# Patient Record
Sex: MALE | Race: WHITE | NOT HISPANIC OR LATINO
[De-identification: names, ages, dates, MRNs, and addresses within clinical notes are randomized per-mention and may not be internally consistent; named-entity substitution may affect disease eponyms.]

---

## 2018-08-07 PROBLEM — Z00.00 ENCOUNTER FOR PREVENTIVE HEALTH EXAMINATION: Status: ACTIVE | Noted: 2018-08-07

## 2018-09-05 ENCOUNTER — APPOINTMENT (OUTPATIENT)
Dept: GASTROENTEROLOGY | Facility: CLINIC | Age: 33
End: 2018-09-05

## 2018-09-17 ENCOUNTER — APPOINTMENT (OUTPATIENT)
Dept: HEART AND VASCULAR | Facility: CLINIC | Age: 33
End: 2018-09-17

## 2018-10-22 ENCOUNTER — APPOINTMENT (OUTPATIENT)
Dept: GASTROENTEROLOGY | Facility: CLINIC | Age: 33
End: 2018-10-22
Payer: MEDICAID

## 2018-10-22 VITALS
DIASTOLIC BLOOD PRESSURE: 85 MMHG | BODY MASS INDEX: 43.95 KG/M2 | HEIGHT: 68 IN | WEIGHT: 290 LBS | RESPIRATION RATE: 16 BRPM | SYSTOLIC BLOOD PRESSURE: 142 MMHG | TEMPERATURE: 98.1 F | OXYGEN SATURATION: 97 % | HEART RATE: 95 BPM

## 2018-10-22 DIAGNOSIS — Z86.79 PERSONAL HISTORY OF OTHER DISEASES OF THE CIRCULATORY SYSTEM: ICD-10-CM

## 2018-10-22 DIAGNOSIS — Z83.79 FAMILY HISTORY OF OTHER DISEASES OF THE DIGESTIVE SYSTEM: ICD-10-CM

## 2018-10-22 PROCEDURE — 99245 OFF/OP CONSLTJ NEW/EST HI 55: CPT | Mod: 25

## 2018-10-22 PROCEDURE — 36415 COLL VENOUS BLD VENIPUNCTURE: CPT

## 2018-10-23 LAB
ANION GAP SERPL CALC-SCNC: 15 MMOL/L
BASOPHILS # BLD AUTO: 0.01 K/UL
BASOPHILS NFR BLD AUTO: 0.1 %
BUN SERPL-MCNC: 20 MG/DL
CALCIUM SERPL-MCNC: 9.3 MG/DL
CHLORIDE SERPL-SCNC: 105 MMOL/L
CO2 SERPL-SCNC: 22 MMOL/L
CREAT SERPL-MCNC: 0.81 MG/DL
CRP SERPL-MCNC: 2.28 MG/DL
EOSINOPHIL # BLD AUTO: 0.07 K/UL
EOSINOPHIL NFR BLD AUTO: 0.8 %
GLUCOSE SERPL-MCNC: 94 MG/DL
HCT VFR BLD CALC: 45.9 %
HGB BLD-MCNC: 14.3 G/DL
IMM GRANULOCYTES NFR BLD AUTO: 0.2 %
LYMPHOCYTES # BLD AUTO: 1.97 K/UL
LYMPHOCYTES NFR BLD AUTO: 23.1 %
MAN DIFF?: NORMAL
MCHC RBC-ENTMCNC: 24.7 PG
MCHC RBC-ENTMCNC: 31.2 GM/DL
MCV RBC AUTO: 79.3 FL
MEV IGG FLD QL IA: 279 AU/ML
MEV IGG+IGM SER-IMP: POSITIVE
MONOCYTES # BLD AUTO: 0.61 K/UL
MONOCYTES NFR BLD AUTO: 7.2 %
MUV AB SER-ACNC: NEGATIVE
MUV IGG SER QL IA: <5 AU/ML
NEUTROPHILS # BLD AUTO: 5.85 K/UL
NEUTROPHILS NFR BLD AUTO: 68.6 %
PLATELET # BLD AUTO: 274 K/UL
POTASSIUM SERPL-SCNC: 4.3 MMOL/L
RBC # BLD: 5.79 M/UL
RBC # FLD: 15.1 %
RUBV IGG FLD-ACNC: 1.9 INDEX
RUBV IGG SER-IMP: POSITIVE
SODIUM SERPL-SCNC: 142 MMOL/L
VZV AB TITR SER: POSITIVE
VZV IGG SER IF-ACNC: 924.1 INDEX
WBC # FLD AUTO: 8.53 K/UL

## 2018-10-30 ENCOUNTER — OUTPATIENT (OUTPATIENT)
Dept: OUTPATIENT SERVICES | Facility: HOSPITAL | Age: 33
LOS: 1 days | End: 2018-10-30

## 2018-10-30 ENCOUNTER — APPOINTMENT (OUTPATIENT)
Dept: CT IMAGING | Facility: CLINIC | Age: 33
End: 2018-10-30
Payer: MEDICAID

## 2018-10-30 LAB
ANTIBODIES TO ADALIMUMAB (ATA) CONCENTRATION: 5.3 U/ML
HBV CORE IGG+IGM SER QL: NONREACTIVE
HBV SURFACE AB SERPL IA-ACNC: <3 MIU/ML
HBV SURFACE AG SER QL: NONREACTIVE
HCV AB SER QL: NONREACTIVE
HCV S/CO RATIO: 0.26 S/CO
M TB IFN-G BLD-IMP: NEGATIVE
PROMETHEUS ANSER ADA: NORMAL
PROMETHEUS LABORATORY FOOTER: NORMAL
QUANTIFERON TB PLUS MITOGEN MINUS NIL: >10 IU/ML
QUANTIFERON TB PLUS NIL: 0.29 IU/ML
QUANTIFERON TB PLUS TB1 MINUS NIL: -0.03 IU/ML
QUANTIFERON TB PLUS TB2 MINUS NIL: -0.12 IU/ML
SERUM ADALIMUMAB (ADA) CONCENTRATION: < 1.6 UG/ML

## 2018-10-30 PROCEDURE — 74177 CT ABD & PELVIS W/CONTRAST: CPT | Mod: 26

## 2018-12-17 ENCOUNTER — OUTPATIENT (OUTPATIENT)
Dept: OUTPATIENT SERVICES | Facility: HOSPITAL | Age: 33
LOS: 1 days | Discharge: HOME | End: 2018-12-17
Payer: MEDICAID

## 2018-12-17 ENCOUNTER — RESULT REVIEW (OUTPATIENT)
Age: 33
End: 2018-12-17

## 2018-12-17 ENCOUNTER — APPOINTMENT (OUTPATIENT)
Dept: GASTROENTEROLOGY | Facility: HOSPITAL | Age: 33
End: 2018-12-17

## 2018-12-17 PROCEDURE — 43239 EGD BIOPSY SINGLE/MULTIPLE: CPT | Mod: GC

## 2018-12-17 PROCEDURE — 43239 EGD BIOPSY SINGLE/MULTIPLE: CPT

## 2018-12-17 PROCEDURE — 45380 COLONOSCOPY AND BIOPSY: CPT | Mod: GC

## 2018-12-17 PROCEDURE — 88305 TISSUE EXAM BY PATHOLOGIST: CPT

## 2018-12-17 PROCEDURE — 45380 COLONOSCOPY AND BIOPSY: CPT

## 2018-12-18 LAB — SURGICAL PATHOLOGY STUDY: SIGNIFICANT CHANGE UP

## 2019-01-14 ENCOUNTER — APPOINTMENT (OUTPATIENT)
Dept: GASTROENTEROLOGY | Facility: CLINIC | Age: 34
End: 2019-01-14
Payer: MEDICAID

## 2019-01-14 VITALS
WEIGHT: 272 LBS | TEMPERATURE: 98 F | HEART RATE: 93 BPM | OXYGEN SATURATION: 97 % | RESPIRATION RATE: 14 BRPM | DIASTOLIC BLOOD PRESSURE: 85 MMHG | BODY MASS INDEX: 41.36 KG/M2 | SYSTOLIC BLOOD PRESSURE: 138 MMHG

## 2019-01-14 PROCEDURE — 99214 OFFICE O/P EST MOD 30 MIN: CPT

## 2019-01-15 NOTE — ASSESSMENT
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 No freeman-anal disease. Evidence of ongoing inflammation with CRP 2.5. Developed Ab to ADA in the context of intermittent dosing and poor compliance. Clinically has adjusted to the disease and is not very symptomatic. Suspect this is due to decompression of strictured loops by fistulae. Previously exposed to a single dose of IFX, stopped due to out of pocket costs.  \par \par # advanced stricturing/ fistulizing ileocolonic CD\par - Explained to patient that at some point, he will need surgery. Unclear what this would entail and how much bowel would need to be resected. Will refer to Dr Booker for further evaluation and discuss in IBD Conf.\par - start paperwork for Stelara. Pt has developed Ab to ADA c/w with his history of noncompliance. Previously exposed to IFX. 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 Also has psoriasis, making Stelara an appropriate choice. \par - A detailed discussion of the risks and benefits of biologic therapy occurred. We detailed increased risk of infections (bacterial, fungal, viral) which we will mitigate by immunizing in appropriate fashion (HBV,zoster, flu, pneumovax); risk of malignancy with time spent on lymphoma (HSTCL in young adults) and skin cancers (need for derm annual eval) ; risk of liver injury, bone marrow suppression, CNS disorders, allergic and infusion (if IV admin) reactions, idiosyncratic skin reactions, joint problems, among other rare and unusual manifestations.  We discussed the need to notify if fevers or major illness prior to infusions, and the need to maintain follow up and obtain request labs and evaluations.  In addition, we have already discusses resources such as [facility] and the manufacturers "patients" page to obtain additional detailed information on the medication.\par - discussed injections and compliance at length. \par - RTC after Stelara infusion. \par - can continue daily Cipro and sulfasalazine until next visit, but plan to d/c abx once on biologic.\par - request prior records\par \par # HCM \par - check Vit D\par - derm/ vaccinations next visit\par - obesity: once acute issues sorted, can refer to endocrine. \par \par Pt seen and d/w Dr Royal; FTF 45min, mostly counseling on med and anatomic findings.\par \par Jossie Arroyo MD, PGY7\par Advanced IBD Fellow\par

## 2019-01-15 NOTE — HISTORY OF PRESENT ILLNESS
[de-identified] : 33 M, diagnosed with CD 10 years ago (colonic and TI involvement as well as freeman-anal disease), possible fistula (passes air on urination at times?) on ADA, but inconsistent with it, referred for a second-opinion. \par Has intermittent symptoms of bloating and gassiness. Diarrhea on-off. Has learned to live with them and is not bothered very much by symptoms. \par CTE with matted small and large bowel loops and multiple fistulae, scarring\par Colonoscopy with evidence of scarring, pseudopolyps, fistula opening seen. Strictured AC, cecal inflammatory polyps. Unable to advance to TI due to scarring \par EGD with gastric erythema, path (-) for HP\par CRP elevated at 2.5, ADA level low, Ab(+)\par \par IBD  history: \par He sees Dr Heck and on his last colonoscopy 2.5 yrs ago, he had a stricture (unclear where) and was recommended surgery. He says he feels okay, does not have any significant symptoms, abd pian/distention/ inc bowel sounds at times with heavy foods, but symptoms resolve over time and he goes on with his life. Also complains of some nausea and possible reflux at times. \par \par Admits he doesn’t take adalimumab on regular maintenance [id number]; unable to tell me what day his injection is...reports that prolonged injection was based on CRP control and not toward standard dosing; denies any issue of difficulty paying/obtaining Rx (though it did come up with IFX infusions)\par \par Denies EIM. No smoking. Last ADA dose was ~4 weeks ago. \par + anal fissures\par + psoriasis \par \par

## 2019-01-15 NOTE — PHYSICAL EXAM
[General Appearance - Alert] : alert [General Appearance - In No Acute Distress] : in no acute distress [Sclera] : the sclera and conjunctiva were normal [Outer Ear] : the ears and nose were normal in appearance [Examination Of The Oral Cavity] : the lips and gums were normal [Oropharynx] : the oropharynx was normal [Neck Appearance] : the appearance of the neck was normal [] : no respiratory distress [Heart Rate And Rhythm] : heart rate was normal and rhythm regular [Abdomen Soft] : soft [Abdomen Tenderness] : non-tender [Cervical Lymph Nodes Enlarged Anterior Bilaterally] : anterior cervical [No CVA Tenderness] : no ~M costovertebral angle tenderness [Abnormal Walk] : normal gait [Oriented To Time, Place, And Person] : oriented to person, place, and time [FreeTextEntry1] : diffuse scaling, desquamation on ears/arms/chest

## 2019-01-15 NOTE — CONSULT LETTER
[Please see my note below.] : Please see my note below. [Sincerely,] : Sincerely, [Dear  ___] : Dear  [unfilled], [Courtesy Letter:] : I had the pleasure of seeing your patient, [unfilled], in my office today. [FreeTextEntry3] : Erik Royal MD\par Director, IBD Program\par [facility], [facility]\par \par Associate Professor of Medicine\par [employer]\par [employer] at [facility]\par

## 2019-02-04 ENCOUNTER — OUTPATIENT (OUTPATIENT)
Dept: OUTPATIENT SERVICES | Facility: HOSPITAL | Age: 34
LOS: 1 days | End: 2019-02-04
Payer: MEDICAID

## 2019-02-04 ENCOUNTER — APPOINTMENT (OUTPATIENT)
Dept: INFUSION THERAPY | Facility: HOSPITAL | Age: 34
End: 2019-02-04

## 2019-02-04 VITALS
TEMPERATURE: 99 F | OXYGEN SATURATION: 99 % | HEART RATE: 92 BPM | WEIGHT: 270.07 LBS | RESPIRATION RATE: 18 BRPM | HEIGHT: 66 IN | DIASTOLIC BLOOD PRESSURE: 75 MMHG | SYSTOLIC BLOOD PRESSURE: 144 MMHG

## 2019-02-04 DIAGNOSIS — K50.90 CROHN'S DISEASE, UNSPECIFIED, WITHOUT COMPLICATIONS: ICD-10-CM

## 2019-02-04 LAB
ALBUMIN SERPL ELPH-MCNC: 4.3 G/DL — SIGNIFICANT CHANGE UP (ref 3.3–5)
ALP SERPL-CCNC: 79 U/L — SIGNIFICANT CHANGE UP (ref 40–120)
ALT FLD-CCNC: 15 U/L — SIGNIFICANT CHANGE UP (ref 10–45)
ANION GAP SERPL CALC-SCNC: 11 MMOL/L — SIGNIFICANT CHANGE UP (ref 5–17)
AST SERPL-CCNC: 14 U/L — SIGNIFICANT CHANGE UP (ref 10–40)
BILIRUB DIRECT SERPL-MCNC: <0.2 MG/DL — SIGNIFICANT CHANGE UP (ref 0–0.2)
BILIRUB INDIRECT FLD-MCNC: >0.1 MG/DL — LOW (ref 0.2–1)
BILIRUB SERPL-MCNC: 0.3 MG/DL — SIGNIFICANT CHANGE UP (ref 0.2–1.2)
BUN SERPL-MCNC: 16 MG/DL — SIGNIFICANT CHANGE UP (ref 7–23)
CALCIUM SERPL-MCNC: 9.6 MG/DL — SIGNIFICANT CHANGE UP (ref 8.4–10.5)
CHLORIDE SERPL-SCNC: 104 MMOL/L — SIGNIFICANT CHANGE UP (ref 96–108)
CO2 SERPL-SCNC: 27 MMOL/L — SIGNIFICANT CHANGE UP (ref 22–31)
CREAT SERPL-MCNC: 0.68 MG/DL — SIGNIFICANT CHANGE UP (ref 0.5–1.3)
CRP SERPL-MCNC: 1.66 MG/DL — HIGH (ref 0–0.4)
GLUCOSE SERPL-MCNC: 96 MG/DL — SIGNIFICANT CHANGE UP (ref 70–99)
HCT VFR BLD CALC: 44.2 % — SIGNIFICANT CHANGE UP (ref 39–50)
HGB BLD-MCNC: 14 G/DL — SIGNIFICANT CHANGE UP (ref 13–17)
MCHC RBC-ENTMCNC: 24.2 PG — LOW (ref 27–34)
MCHC RBC-ENTMCNC: 31.7 G/DL — LOW (ref 32–36)
MCV RBC AUTO: 76.3 FL — LOW (ref 80–100)
PLATELET # BLD AUTO: 243 K/UL — SIGNIFICANT CHANGE UP (ref 150–400)
POTASSIUM SERPL-MCNC: 4.6 MMOL/L — SIGNIFICANT CHANGE UP (ref 3.5–5.3)
POTASSIUM SERPL-SCNC: 4.6 MMOL/L — SIGNIFICANT CHANGE UP (ref 3.5–5.3)
PROT SERPL-MCNC: 7.2 G/DL — SIGNIFICANT CHANGE UP (ref 6–8.3)
RBC # BLD: 5.79 M/UL — SIGNIFICANT CHANGE UP (ref 4.2–5.8)
RBC # FLD: 14.7 % — SIGNIFICANT CHANGE UP (ref 10.3–16.9)
SODIUM SERPL-SCNC: 142 MMOL/L — SIGNIFICANT CHANGE UP (ref 135–145)
WBC # BLD: 7 K/UL — SIGNIFICANT CHANGE UP (ref 3.8–10.5)
WBC # FLD AUTO: 7 K/UL — SIGNIFICANT CHANGE UP (ref 3.8–10.5)

## 2019-02-04 PROCEDURE — 86140 C-REACTIVE PROTEIN: CPT

## 2019-02-04 PROCEDURE — 80048 BASIC METABOLIC PNL TOTAL CA: CPT

## 2019-02-04 PROCEDURE — 36415 COLL VENOUS BLD VENIPUNCTURE: CPT

## 2019-02-04 PROCEDURE — 96413 CHEMO IV INFUSION 1 HR: CPT

## 2019-02-04 PROCEDURE — 80076 HEPATIC FUNCTION PANEL: CPT

## 2019-02-04 PROCEDURE — 85027 COMPLETE CBC AUTOMATED: CPT

## 2019-02-04 RX ORDER — USTEKINUMAB 45 MG/.5ML
520 INJECTION, SOLUTION SUBCUTANEOUS ONCE
Qty: 0 | Refills: 0 | Status: COMPLETED | OUTPATIENT
Start: 2019-02-04 | End: 2019-02-04

## 2019-02-04 RX ORDER — LORATADINE 10 MG/1
10 TABLET ORAL ONCE
Qty: 0 | Refills: 0 | Status: COMPLETED | OUTPATIENT
Start: 2019-02-04 | End: 2019-02-04

## 2019-02-04 RX ORDER — ACETAMINOPHEN 500 MG
650 TABLET ORAL ONCE
Qty: 0 | Refills: 0 | Status: COMPLETED | OUTPATIENT
Start: 2019-02-04 | End: 2019-02-04

## 2019-02-04 RX ADMIN — Medication 650 MILLIGRAM(S): at 13:07

## 2019-02-04 RX ADMIN — LORATADINE 10 MILLIGRAM(S): 10 TABLET ORAL at 13:07

## 2019-02-04 RX ADMIN — USTEKINUMAB 250 MILLIGRAM(S): 45 INJECTION, SOLUTION SUBCUTANEOUS at 13:16

## 2019-03-04 ENCOUNTER — APPOINTMENT (OUTPATIENT)
Dept: GASTROENTEROLOGY | Facility: CLINIC | Age: 34
End: 2019-03-04
Payer: MEDICAID

## 2019-03-04 VITALS
OXYGEN SATURATION: 97 % | SYSTOLIC BLOOD PRESSURE: 140 MMHG | RESPIRATION RATE: 16 BRPM | BODY MASS INDEX: 40.92 KG/M2 | HEIGHT: 68 IN | HEART RATE: 96 BPM | WEIGHT: 270 LBS | DIASTOLIC BLOOD PRESSURE: 80 MMHG

## 2019-03-04 PROCEDURE — 99214 OFFICE O/P EST MOD 30 MIN: CPT | Mod: 25

## 2019-03-04 PROCEDURE — 36415 COLL VENOUS BLD VENIPUNCTURE: CPT

## 2019-03-05 NOTE — HISTORY OF PRESENT ILLNESS
[de-identified] : 33 M, diagnosed with CD 10 years ago (colonic and TI involvement as well as freeman-anal disease), possible fistula (passes air on urination at times, "josi" urine at times) on ADA, but inconsistently, here for follow up. \par \par Given ongoing symptoms, elevated CRP and psoriasis, he was started on UST, and he received his first infusion dose on 2/4/19. \par \par Today, he feels much better, almost 90%!! Bloating, gassiness and cramping are better. Stool is more formed. Psoriasis waxes and wanes, unclear if he is better. No EIM. Tolerated UST well. \par No other complaint at this time. \par \par Previously had intermittent symptoms of bloating and gassiness. Diarrhea on-off. Has learned to live with them and is not bothered very much by symptoms. \par \par CTE with matted small and large bowel loops and multiple fistulae, scarring\par Colonoscopy with evidence of scarring, pseudopolyps, fistula opening seen. Strictured AC, cecal inflammatory polyps. Unable to advance to TI due to scarring \par EGD with gastric erythema, path (-) for HP\par CRP elevated at 2.5, ADA level low, Ab(+)\par \par IBD  history: \par He sees Dr Heck and on his last colonoscopy 2.5 yrs ago, he had a stricture (unclear where) and was recommended surgery. He says he feels okay, does not have any significant symptoms, abd pian/distention/ inc bowel sounds at times with heavy foods, but symptoms resolve over time and he goes on with his life. Also complains of some nausea and possible reflux at times. \par \par Admits he doesn’t take adalimumab on regular maintenance [id number]; unable to tell me what day his injection is...reports that prolonged injection was based on CRP control and not toward standard dosing; denies any issue of difficulty paying/obtaining Rx (though it did come up with IFX infusions)\par \par Denies EIM. No smoking. Last ADA dose was ~4 weeks ago. \par + anal fissures\par + psoriasis \par \par

## 2019-03-05 NOTE — ASSESSMENT
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 No active freeman-anal disease at this time. Evidence of ongoing inflammation with high CRP. Developed Ab to ADA in the context of intermittent dosing and poor compliance. Clinically has adjusted to the disease and is not very symptomatic. Suspect this is due to decompression of strictured loops by fistulae. Previously exposed to a single dose of IFX, stopped due to out of pocket costs. Now s/p UST infusion, with significant improvement\par \par # advanced stricturing/ fistulizing ileocolonic CD\par - pt feels much better with a single dose of UST. Continue q8 week dosing. SQ doses ordered\par - check CBC, CMP and CRP today\par - discussed injections and compliance at length. \par - RTC 4 weeks after UST injection\par - can stop antibiotics. No evidence of infection, no pneumaturia at this time\par - refer to Dr Booker, surgery, for discussion of elective surgery; suspect he will eventually need it, given anatomy and disease burden. \par - refer to urology to evaluate EV fistula and for cystoscopy, if needed\par - request prior records\par \par # HCM \par - check Vit D\par - derm/ vaccinations next visit\par - obesity: once acute issues sorted, can refer to endocrine\par \par RTC 8 weeks, sooner as needed\par \par Pt seen and d/w Dr Royal\par \par Jossie Arroyo MD, PGY7\par Advanced IBD Fellow\par

## 2019-03-05 NOTE — PHYSICAL EXAM
[General Appearance - Alert] : alert [General Appearance - In No Acute Distress] : in no acute distress [Sclera] : the sclera and conjunctiva were normal [Examination Of The Oral Cavity] : the lips and gums were normal [Neck Appearance] : the appearance of the neck was normal [] : no respiratory distress [Abdomen Soft] : soft [Abdomen Tenderness] : non-tender [No CVA Tenderness] : no ~M costovertebral angle tenderness [Abnormal Walk] : normal gait [Oriented To Time, Place, And Person] : oriented to person, place, and time [FreeTextEntry1] : diffuse scaling, desquamation on ears/arms/chest

## 2019-03-05 NOTE — CONSULT LETTER
[Courtesy Letter:] : I had the pleasure of seeing your patient, [unfilled], in my office today. [Please see my note below.] : Please see my note below. [Sincerely,] : Sincerely, [Dear  ___] : Dear  [unfilled], [FreeTextEntry3] : Erik Royal MD\par Director, IBD Program\par [facility], [facility]\par \par Associate Professor of Medicine\par [employer]\par [employer] at [facility]\par

## 2019-03-11 LAB
ALBUMIN SERPL ELPH-MCNC: 4.7 G/DL
ALP BLD-CCNC: 87 U/L
ALT SERPL-CCNC: 20 U/L
ANION GAP SERPL CALC-SCNC: 11 MMOL/L
AST SERPL-CCNC: 15 U/L
BASOPHILS # BLD AUTO: 0.04 K/UL
BASOPHILS NFR BLD AUTO: 0.6 %
BILIRUB SERPL-MCNC: 0.3 MG/DL
BUN SERPL-MCNC: 18 MG/DL
CALCIUM SERPL-MCNC: 9.9 MG/DL
CHLORIDE SERPL-SCNC: 101 MMOL/L
CO2 SERPL-SCNC: 27 MMOL/L
CREAT SERPL-MCNC: 0.66 MG/DL
CRP SERPL-MCNC: 1.28 MG/DL
EOSINOPHIL # BLD AUTO: 0.11 K/UL
EOSINOPHIL NFR BLD AUTO: 1.5 %
GLUCOSE SERPL-MCNC: 102 MG/DL
HCT VFR BLD CALC: 47.7 %
HGB BLD-MCNC: 14.4 G/DL
IMM GRANULOCYTES NFR BLD AUTO: 0.3 %
LYMPHOCYTES # BLD AUTO: 1.61 K/UL
LYMPHOCYTES NFR BLD AUTO: 22.6 %
MAN DIFF?: NORMAL
MCHC RBC-ENTMCNC: 23.9 PG
MCHC RBC-ENTMCNC: 30.2 GM/DL
MCV RBC AUTO: 79.1 FL
MONOCYTES # BLD AUTO: 0.6 K/UL
MONOCYTES NFR BLD AUTO: 8.4 %
NEUTROPHILS # BLD AUTO: 4.74 K/UL
NEUTROPHILS NFR BLD AUTO: 66.6 %
PLATELET # BLD AUTO: 254 K/UL
POTASSIUM SERPL-SCNC: 5.1 MMOL/L
PROT SERPL-MCNC: 7.4 G/DL
RBC # BLD: 6.03 M/UL
RBC # FLD: 14.3 %
SODIUM SERPL-SCNC: 139 MMOL/L
WBC # FLD AUTO: 7.12 K/UL

## 2019-04-29 ENCOUNTER — APPOINTMENT (OUTPATIENT)
Dept: GASTROENTEROLOGY | Facility: CLINIC | Age: 34
End: 2019-04-29
Payer: MEDICAID

## 2019-04-29 VITALS
SYSTOLIC BLOOD PRESSURE: 140 MMHG | HEART RATE: 109 BPM | WEIGHT: 277 LBS | DIASTOLIC BLOOD PRESSURE: 70 MMHG | BODY MASS INDEX: 41.98 KG/M2 | RESPIRATION RATE: 16 BRPM | HEIGHT: 68 IN | OXYGEN SATURATION: 97 %

## 2019-04-29 DIAGNOSIS — R12 HEARTBURN: ICD-10-CM

## 2019-04-29 PROCEDURE — 99214 OFFICE O/P EST MOD 30 MIN: CPT | Mod: GC

## 2019-04-29 RX ORDER — CIPROFLOXACIN HYDROCHLORIDE 500 MG/1
TABLET, FILM COATED ORAL
Refills: 0 | Status: DISCONTINUED | COMMUNITY
End: 2019-04-29

## 2019-04-29 RX ORDER — ADALIMUMAB 20MG/0.4ML
KIT SUBCUTANEOUS
Refills: 0 | Status: DISCONTINUED | COMMUNITY
End: 2019-04-29

## 2019-04-29 NOTE — CONSULT LETTER
[Dear  ___] : Dear  [unfilled], [Courtesy Letter:] : I had the pleasure of seeing your patient, [unfilled], in my office today. [Please see my note below.] : Please see my note below. [Sincerely,] : Sincerely, [FreeTextEntry3] : Erik Royal MD\par Director, IBD Program\par [facility], [facility]\par \par Associate Professor of Medicine\par [employer]\par [employer] at [facility]\par

## 2019-04-29 NOTE — HISTORY OF PRESENT ILLNESS
[de-identified] : 34 YO M diagnosed with psoriasis and inflammatory ileocolonic CD with likely scarring, stricturing, and multiple fistulae (entero-enteric, entero-colon, and entero-vesicular), previously on ADA however, developed Ab due to poor compliance/intermittent dosing, currently on monotherapy UST (started 2/4/19) s/p inj x 1 (3/4/19) presents for follow-up. \par \par INTERVAL HX:\par Patient states that he feels significantly better than previous. In addition, his psoriasis has also significantly improved. Patient states that in the last month he had 1 episode of diarrhea, however, attributes this to a change in diet during Passover, overall, having formed stools. Pt states that in the last month he has had pneumaturia possibly 1-2x, however, does not recall the episodes, denies dysuria, hematuria, urinary frequency/hesitancy. Denies fever, chills, abdominal pain, nausea, vomiting, melena, hematochezia, nocturnal symptoms. \par \par Of note, he is going to lose his insurance during the month of May, however, will resume in June. Has not seen urology or colorectal surgery. \par \par Denies EIMs. \par \par IBD  history: \par He sees Dr Heck and on his last colonoscopy 2.5 yrs ago, he had a stricture (unclear where) and was recommended surgery. He says he feels okay, does not have any significant symptoms, abd pian/distention/ inc bowel sounds at times with heavy foods, but symptoms resolve over time and he goes on with his life. Also complains of some nausea and possible reflux at times. \par \par Admits he doesn’t take adalimumab on regular maintenance [id number]; unable to tell me what day his injection is...reports that prolonged injection was based on CRP control and not toward standard dosing; denies any issue of difficulty paying/obtaining Rx (though it did come up with IFX infusions)\par \par Denies EIM. No smoking. Last ADA dose was ~4 weeks ago. \par + anal fissures\par + psoriasis \par \par Previously had intermittent symptoms of bloating and gassiness. Diarrhea on-off. Has learned to live with them and is not bothered very much by symptoms. \par \par CTE with matted small and large bowel loops and multiple fistulae, scarring\par Colonoscopy with evidence of scarring, pseudopolyps, fistula opening seen. Strictured AC, cecal inflammatory polyps. Unable to advance to TI due to scarring \par EGD with gastric erythema, path (-) for HP\par CRP elevated at 2.5, ADA level low, Ab(+)\par

## 2019-04-29 NOTE — PHYSICAL EXAM
[General Appearance - Alert] : alert [General Appearance - In No Acute Distress] : in no acute distress [Sclera] : the sclera and conjunctiva were normal [Examination Of The Oral Cavity] : the lips and gums were normal [Neck Appearance] : the appearance of the neck was normal [Abdomen Soft] : soft [Abdomen Tenderness] : non-tender [No CVA Tenderness] : no ~M costovertebral angle tenderness [Abnormal Walk] : normal gait [Oriented To Time, Place, And Person] : oriented to person, place, and time [General Appearance - Well Nourished] : well nourished [General Appearance - Well Developed] : well developed [General Appearance - Well-Appearing] : healthy appearing [] : normal voice and communication [PERRL With Normal Accommodation] : pupils were equal in size, round, and reactive to light [Outer Ear] : the ears and nose were normal in appearance [Cervical Lymph Nodes Enlarged Posterior Bilaterally] : posterior cervical [Cervical Lymph Nodes Enlarged Anterior Bilaterally] : anterior cervical [No Spinal Tenderness] : no spinal tenderness [No Focal Deficits] : no focal deficits [Affect] : the affect was normal [Mood] : the mood was normal [FreeTextEntry1] : diffuse scaling, desquamation on ears/arms/chest, improved

## 2019-04-29 NOTE — ASSESSMENT
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 No active freeman-anal disease at this time. Evidence of ongoing inflammation with high CRP. Developed Ab to ADA in the context of intermittent dosing and poor compliance. Clinically has adjusted to the disease and is not very symptomatic. Suspect this is due to decompression of strictured loops by fistulae. Previously exposed to a single dose of IFX, stopped due to out of pocket costs. Now s/p UST infusion and injection x1, with significant improvement\par \par # advanced stricturing/ fistulizing ileocolonic CD\par - Continue UST q8 week dosing\par - Gave 1 sample of UST, to be injected approx. May 29\par - Pt instructed to follow-up in June, 4 weeks after last injection, for routine monitoring. Will need to obtain UST levels , but may not be timely given insurance interruption and planned summer holiday\par - check CBC, CMP and CRP today\par - discussed injections and compliance at length. \par - Emphasized importance of evaluation with Dr. Booker, colorectal surgery for discussion of elective surgery given anatomy and disease burden\par \par # ? enterovesicular fistula\par - Unclear symptom history of pneumaturia, however, without urinary symptoms, thus low suspicion for clinically significant entero-vesicular fistula\par - Urology referral\par \par # HCM \par - check Vit D\par - derm/ vaccinations next visit\par - obesity: once acute issues sorted, can refer to endocrine\par \par RTC 8 weeks, sooner as needed

## 2019-04-30 LAB
25(OH)D3 SERPL-MCNC: 20.9 NG/ML
ALBUMIN SERPL ELPH-MCNC: 4.6 G/DL
ALP BLD-CCNC: 87 U/L
ALT SERPL-CCNC: 18 U/L
ANION GAP SERPL CALC-SCNC: 11 MMOL/L
AST SERPL-CCNC: 20 U/L
BASOPHILS # BLD AUTO: 0.03 K/UL
BASOPHILS NFR BLD AUTO: 0.4 %
BILIRUB DIRECT SERPL-MCNC: 0.1 MG/DL
BILIRUB INDIRECT SERPL-MCNC: 0.2 MG/DL
BILIRUB SERPL-MCNC: 0.3 MG/DL
BUN SERPL-MCNC: 16 MG/DL
CALCIUM SERPL-MCNC: 9.5 MG/DL
CHLORIDE SERPL-SCNC: 103 MMOL/L
CO2 SERPL-SCNC: 27 MMOL/L
CREAT SERPL-MCNC: 0.84 MG/DL
CRP SERPL-MCNC: 1.95 MG/DL
EOSINOPHIL # BLD AUTO: 0.09 K/UL
EOSINOPHIL NFR BLD AUTO: 1.3 %
GLUCOSE SERPL-MCNC: 104 MG/DL
HCT VFR BLD CALC: 50 %
HGB BLD-MCNC: 14.9 G/DL
IMM GRANULOCYTES NFR BLD AUTO: 0.4 %
LYMPHOCYTES # BLD AUTO: 1.64 K/UL
LYMPHOCYTES NFR BLD AUTO: 23.7 %
MAN DIFF?: NORMAL
MCHC RBC-ENTMCNC: 24.5 PG
MCHC RBC-ENTMCNC: 29.8 GM/DL
MCV RBC AUTO: 82.1 FL
MONOCYTES # BLD AUTO: 0.63 K/UL
MONOCYTES NFR BLD AUTO: 9.1 %
NEUTROPHILS # BLD AUTO: 4.5 K/UL
NEUTROPHILS NFR BLD AUTO: 65.1 %
PLATELET # BLD AUTO: 224 K/UL
POTASSIUM SERPL-SCNC: 5.3 MMOL/L
PROT SERPL-MCNC: 7.1 G/DL
RBC # BLD: 6.09 M/UL
RBC # FLD: 14.9 %
SODIUM SERPL-SCNC: 141 MMOL/L
WBC # FLD AUTO: 6.92 K/UL

## 2019-05-01 ENCOUNTER — RX RENEWAL (OUTPATIENT)
Age: 34
End: 2019-05-01

## 2019-05-01 DIAGNOSIS — E55.9 VITAMIN D DEFICIENCY, UNSPECIFIED: ICD-10-CM

## 2019-05-03 PROBLEM — E55.9 VITAMIN D DEFICIENCY: Status: ACTIVE | Noted: 2019-05-03

## 2019-06-05 ENCOUNTER — APPOINTMENT (OUTPATIENT)
Dept: GASTROENTEROLOGY | Facility: CLINIC | Age: 34
End: 2019-06-05
Payer: MEDICAID

## 2019-06-05 ENCOUNTER — OUTPATIENT (OUTPATIENT)
Dept: OUTPATIENT SERVICES | Facility: HOSPITAL | Age: 34
LOS: 1 days | End: 2019-06-05
Payer: MEDICAID

## 2019-06-05 VITALS
BODY MASS INDEX: 43.49 KG/M2 | HEART RATE: 95 BPM | WEIGHT: 286 LBS | SYSTOLIC BLOOD PRESSURE: 125 MMHG | OXYGEN SATURATION: 97 % | RESPIRATION RATE: 16 BRPM | TEMPERATURE: 98 F | DIASTOLIC BLOOD PRESSURE: 82 MMHG

## 2019-06-05 PROCEDURE — 36415 COLL VENOUS BLD VENIPUNCTURE: CPT

## 2019-06-05 PROCEDURE — 74019 RADEX ABDOMEN 2 VIEWS: CPT

## 2019-06-05 PROCEDURE — 74019 RADEX ABDOMEN 2 VIEWS: CPT | Mod: 26

## 2019-06-05 PROCEDURE — 99214 OFFICE O/P EST MOD 30 MIN: CPT | Mod: 25

## 2019-06-06 ENCOUNTER — RX RENEWAL (OUTPATIENT)
Age: 34
End: 2019-06-06

## 2019-06-06 LAB
ALBUMIN SERPL ELPH-MCNC: 4.6 G/DL
ALP BLD-CCNC: 86 U/L
ALT SERPL-CCNC: 23 U/L
ANION GAP SERPL CALC-SCNC: 12 MMOL/L
AST SERPL-CCNC: 17 U/L
BASOPHILS # BLD AUTO: 0.04 K/UL
BASOPHILS NFR BLD AUTO: 0.5 %
BILIRUB SERPL-MCNC: 0.4 MG/DL
BUN SERPL-MCNC: 19 MG/DL
CALCIUM SERPL-MCNC: 9.5 MG/DL
CHLORIDE SERPL-SCNC: 104 MMOL/L
CO2 SERPL-SCNC: 24 MMOL/L
CREAT SERPL-MCNC: 0.8 MG/DL
CRP SERPL-MCNC: 2.54 MG/DL
EOSINOPHIL # BLD AUTO: 0.09 K/UL
EOSINOPHIL NFR BLD AUTO: 1.1 %
GLUCOSE SERPL-MCNC: 98 MG/DL
HCT VFR BLD CALC: 47.8 %
HGB BLD-MCNC: 14.5 G/DL
IMM GRANULOCYTES NFR BLD AUTO: 0.4 %
LYMPHOCYTES # BLD AUTO: 1.79 K/UL
LYMPHOCYTES NFR BLD AUTO: 22.1 %
MAN DIFF?: NORMAL
MCHC RBC-ENTMCNC: 24.3 PG
MCHC RBC-ENTMCNC: 30.3 GM/DL
MCV RBC AUTO: 80.2 FL
MONOCYTES # BLD AUTO: 0.63 K/UL
MONOCYTES NFR BLD AUTO: 7.8 %
NEUTROPHILS # BLD AUTO: 5.53 K/UL
NEUTROPHILS NFR BLD AUTO: 68.1 %
PLATELET # BLD AUTO: 245 K/UL
POTASSIUM SERPL-SCNC: 4.9 MMOL/L
PROT SERPL-MCNC: 7.1 G/DL
RBC # BLD: 5.96 M/UL
RBC # FLD: 14.6 %
SODIUM SERPL-SCNC: 140 MMOL/L
WBC # FLD AUTO: 8.11 K/UL

## 2019-06-06 NOTE — HISTORY OF PRESENT ILLNESS
[de-identified] : 34 Y M diagnosed with psoriasis and inflammatory ileocolonic CD with likely scarring, stricturing, and multiple fistulae (entero-enteric, entero-colon, and entero-vesicular), previously on ADA however, developed Ab due to poor compliance/intermittent dosing, currently on monotherapy UST (started 2/4/19) s/p inj x 2 presents for follow-up with c/o 2 days bloat, back pain and constipation.\par \par Patient reports for the last 2 days, he began having low back pain and constipation. Passing flatus, no nausea/vomiting. No weight loss or appetite changes (actually gained 10 lbs since April). No abdominal pain but does have bloating. No fevers/chills. Have small amount of stool today. \par \par No EIMs. \par \par IBD  history: \par He sees Dr Heck and on his last colonoscopy 2.5 yrs ago, he had a stricture (unclear where) and was recommended surgery. He says he feels okay, does not have any significant symptoms, abd pian/distention/ inc bowel sounds at times with heavy foods, but symptoms resolve over time and he goes on with his life. Also complains of some nausea and possible reflux at times. \par \par Admits he doesn’t take adalimumab on regular maintenance [id number]; unable to tell me what day his injection is...reports that prolonged injection was based on CRP control and not toward standard dosing; denies any issue of difficulty paying/obtaining Rx (though it did come up with IFX infusions)\par \par Denies EIM. No smoking. Last ADA dose was ~ 4 weeks ago. \par + anal fissures\par + psoriasis \par \par Previously had intermittent symptoms of bloating and gassiness. Diarrhea on-off. Has learned to live with them and is not bothered very much by symptoms. \par \par CTE with matted small and large bowel loops and multiple fistulae, scarring\par Colonoscopy with evidence of scarring, pseudopolyps, fistula opening seen. Strictured AC, cecal inflammatory polyps. Unable to advance to TI due to scarring \par EGD with gastric erythema, path (-) for HP\par CRP elevated at 2.5, ADA level low, Ab(+)\par

## 2019-06-06 NOTE — PHYSICAL EXAM
[General Appearance - In No Acute Distress] : in no acute distress [General Appearance - Alert] : alert [General Appearance - Well Nourished] : well nourished [General Appearance - Well Developed] : well developed [General Appearance - Well-Appearing] : healthy appearing [Sclera] : the sclera and conjunctiva were normal [Outer Ear] : the ears and nose were normal in appearance [PERRL With Normal Accommodation] : pupils were equal in size, round, and reactive to light [Examination Of The Oral Cavity] : the lips and gums were normal [Neck Appearance] : the appearance of the neck was normal [] : no respiratory distress [Abdomen Soft] : soft [Abdomen Tenderness] : non-tender [Cervical Lymph Nodes Enlarged Posterior Bilaterally] : posterior cervical [Cervical Lymph Nodes Enlarged Anterior Bilaterally] : anterior cervical [No CVA Tenderness] : no ~M costovertebral angle tenderness [No Spinal Tenderness] : no spinal tenderness [Abnormal Walk] : normal gait [No Focal Deficits] : no focal deficits [Oriented To Time, Place, And Person] : oriented to person, place, and time [Affect] : the affect was normal [Mood] : the mood was normal [Bowel Sounds] : normal bowel sounds [FreeTextEntry1] : diffuse scaling, desquamation on ears/arms/chest, improved

## 2019-06-06 NOTE — ASSESSMENT
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 No active freeman-anal disease at this time. Evidence of ongoing inflammation with high CRP. Developed Ab to ADA in the context of intermittent dosing and poor compliance. Clinically has adjusted to the disease and is not very symptomatic. Suspect this is due to decompression of strictured loops by fistulae. Previously exposed to a single dose of IFX, stopped due to out of pocket costs. Now s/p UST infusion and injection x 2, with initial significant improvement but presents today c/o 2 days of abdominal bloating, back pain and "constipation." \par \par # advanced stricturing/ fistulizing ileocolonic CD\par - Continue UST q8 week dosing\par - xray today to rule out partial small bowel obstruction; if any e/o will rx prednisone\par - advised liquid diet until we read the abdominal xray and discuss findings ; pred if any e/o obstruction. \par - check CBC, CMP and CRP today - CRP persistently elevated \par - discussed injections and compliance at length\par - Emphasized importance of evaluation with Dr. Booker, colorectal surgery for discussion of elective surgery given anatomy and disease burden (more urgent now especially given possible obstruction)\par - CTE end of summer to compare to pre-UST CTE\par \par # ? enterovesicular fistula\par - Unclear symptom history of pneumaturia, however, without urinary symptoms, thus low suspicion for clinically significant entero-vesicular fistula\par - Urology referral - has not gone yet \par \par # HCM \par - Vit D 20 at last visit; on oral repletion\par - derm visit emphasized\par - immune to Hep B\par - hep C negative \par - obesity: once acute issues sorted, can refer to endocrine\par - denies tobacco use or NSAID use\par \par RTC after CTE, sooner if needed and will call with results of xray

## 2019-06-06 NOTE — CONSULT LETTER
[Dear  ___] : Dear  [unfilled], [Courtesy Letter:] : I had the pleasure of seeing your patient, [unfilled], in my office today. [Please see my note below.] : Please see my note below. [Sincerely,] : Sincerely, [FreeTextEntry3] : Erik Royal MD\par Director, IBD Program\par [facility], [facility]\par \par Associate Professor of Medicine\par [employer]\par [employer] at [facility]\par

## 2019-06-07 ENCOUNTER — APPOINTMENT (OUTPATIENT)
Dept: COLORECTAL SURGERY | Facility: CLINIC | Age: 34
End: 2019-06-07
Payer: MEDICAID

## 2019-06-07 VITALS
WEIGHT: 283 LBS | DIASTOLIC BLOOD PRESSURE: 85 MMHG | TEMPERATURE: 98.1 F | HEIGHT: 68 IN | SYSTOLIC BLOOD PRESSURE: 127 MMHG | HEART RATE: 85 BPM | BODY MASS INDEX: 42.89 KG/M2

## 2019-06-07 PROCEDURE — 99204 OFFICE O/P NEW MOD 45 MIN: CPT

## 2019-06-07 NOTE — ASSESSMENT
[FreeTextEntry1] : I reviewed and discussed the patient the role of surgery in the setting of complicated Crohn's disease. At this time given his response to medical treatment and his relative asymptomatic disease despite complex findings on CT scan including in complex fistula and multiple loops of small large bowel involvement, I would refrain from surgical intervention.\par \par I have advised the patient the nature and scope of surgery that would be required to address these findings including potential complex bowel resection and repair of potential enterovesical fistula.\par \par The risks, benefits alternatives were described in detail. The patient be contacted if his symptoms change and/or he fails medical therapy.\par

## 2019-06-07 NOTE — HISTORY OF PRESENT ILLNESS
[FreeTextEntry1] : 35 y/o M presents for evaluation of fistulizing Crohn's disease\par Diagnosed ~ 10 years ago, has been on Humira, Remicade in the past\par Colonoscopy completed 5/23/16 with Dr. Heck, stricture noted at 60 cm\par EGD and Colonoscopy completed 12/18/18 with Dr. Royal\par - Gastritis, duodenitis\par - healed mucosa in most of examined colon\par - fistula in sigmoid colon\par - inflammatory pseudopolyps\par - stricture of ascending colon vs cecal inflammatory polyps, unable to traverse\par \par Last CT completed 10/30/18\par - advanced chronic Crohn's disease with matted loops of large and small bowel with enteroenteric, entercolic and enterovesicular fistulization\par Currently on Stelara Q8 weeks, started on a 2  week course of Prednisone 20 mg daily\par \par Denies abdominal or rectal pain but reports lower back pain just above the coccyx, reports this pain has occurred in the past. Pt attributes to constipation, x-ray completed to r/o impaction/obstruction \par Denies N,V. Reports good appetite \par BH: 1-2 times daily to every other day. Reports occasional incomplete evacuation of bowels\par Reports adequate water intake (multiple bottles of seltzer daily) , admits to limited dietary fiber intake\par Denies FMH of GI cancers, brother has Crohn's disease as well

## 2019-06-07 NOTE — PHYSICAL EXAM
[Abdomen Masses] : No abdominal masses [Abdomen Tenderness] : ~T No ~M abdominal tenderness [No HSM] : no hepatosplenomegaly

## 2019-06-20 ENCOUNTER — RX RENEWAL (OUTPATIENT)
Age: 34
End: 2019-06-20

## 2019-07-12 ENCOUNTER — RX RENEWAL (OUTPATIENT)
Age: 34
End: 2019-07-12

## 2019-09-10 ENCOUNTER — OUTPATIENT (OUTPATIENT)
Dept: OUTPATIENT SERVICES | Facility: HOSPITAL | Age: 34
LOS: 1 days | End: 2019-09-10

## 2019-09-10 ENCOUNTER — APPOINTMENT (OUTPATIENT)
Dept: CT IMAGING | Facility: CLINIC | Age: 34
End: 2019-09-10
Payer: MEDICAID

## 2019-09-10 PROCEDURE — 74177 CT ABD & PELVIS W/CONTRAST: CPT | Mod: 26

## 2019-09-11 ENCOUNTER — OTHER (OUTPATIENT)
Age: 34
End: 2019-09-11

## 2019-09-25 ENCOUNTER — LABORATORY RESULT (OUTPATIENT)
Age: 34
End: 2019-09-25

## 2019-09-25 ENCOUNTER — APPOINTMENT (OUTPATIENT)
Dept: PULMONOLOGY | Facility: CLINIC | Age: 34
End: 2019-09-25
Payer: MEDICAID

## 2019-09-25 ENCOUNTER — APPOINTMENT (OUTPATIENT)
Dept: GASTROENTEROLOGY | Facility: CLINIC | Age: 34
End: 2019-09-25
Payer: MEDICAID

## 2019-09-25 ENCOUNTER — APPOINTMENT (OUTPATIENT)
Dept: PULMONOLOGY | Facility: CLINIC | Age: 34
End: 2019-09-25

## 2019-09-25 VITALS
WEIGHT: 295 LBS | HEART RATE: 115 BPM | HEIGHT: 68 IN | SYSTOLIC BLOOD PRESSURE: 120 MMHG | BODY MASS INDEX: 44.71 KG/M2 | TEMPERATURE: 98.2 F | OXYGEN SATURATION: 97 % | DIASTOLIC BLOOD PRESSURE: 80 MMHG

## 2019-09-25 VITALS
HEIGHT: 68 IN | OXYGEN SATURATION: 96 % | BODY MASS INDEX: 44.71 KG/M2 | TEMPERATURE: 98.8 F | WEIGHT: 295 LBS | SYSTOLIC BLOOD PRESSURE: 142 MMHG | HEART RATE: 100 BPM | DIASTOLIC BLOOD PRESSURE: 90 MMHG

## 2019-09-25 DIAGNOSIS — R91.8 OTHER NONSPECIFIC ABNORMAL FINDING OF LUNG FIELD: ICD-10-CM

## 2019-09-25 PROCEDURE — 99214 OFFICE O/P EST MOD 30 MIN: CPT | Mod: 25

## 2019-09-25 PROCEDURE — 99204 OFFICE O/P NEW MOD 45 MIN: CPT

## 2019-09-25 PROCEDURE — 36415 COLL VENOUS BLD VENIPUNCTURE: CPT

## 2019-09-25 RX ORDER — FAMOTIDINE 40 MG/1
40 TABLET, FILM COATED ORAL DAILY
Qty: 30 | Refills: 3 | Status: DISCONTINUED | COMMUNITY
Start: 2019-04-29 | End: 2019-09-25

## 2019-09-25 RX ORDER — ADHESIVE TAPE 3"X 2.3 YD
50 MCG TAPE, NON-MEDICATED TOPICAL
Qty: 60 | Refills: 5 | Status: DISCONTINUED | COMMUNITY
Start: 2019-05-03 | End: 2019-09-25

## 2019-09-25 NOTE — PHYSICAL EXAM
[General Appearance - Well Developed] : well developed [Normal Appearance] : normal appearance [Well Groomed] : well groomed [No Deformities] : no deformities [General Appearance - Well Nourished] : well nourished [General Appearance - In No Acute Distress] : no acute distress [Normal Conjunctiva] : the conjunctiva exhibited no abnormalities [Eyelids - No Xanthelasma] : the eyelids demonstrated no xanthelasmas [Normal Oropharynx] : normal oropharynx [Neck Appearance] : the appearance of the neck was normal [Neck Cervical Mass (___cm)] : no neck mass was observed [Jugular Venous Distention Increased] : there was no jugular-venous distention [Thyroid Diffuse Enlargement] : the thyroid was not enlarged [Heart Rate And Rhythm] : heart rate and rhythm were normal [Thyroid Nodule] : there were no palpable thyroid nodules [Heart Sounds] : normal S1 and S2 [Murmurs] : no murmurs present [Respiration, Rhythm And Depth] : normal respiratory rhythm and effort [Auscultation Breath Sounds / Voice Sounds] : lungs were clear to auscultation bilaterally [Exaggerated Use Of Accessory Muscles For Inspiration] : no accessory muscle use [Abnormal Walk] : normal gait [Gait - Sufficient For Exercise Testing] : the gait was sufficient for exercise testing [Cyanosis, Localized] : no localized cyanosis [Nail Clubbing] : no clubbing of the fingernails [] : no ischemic changes [Petechial Hemorrhages (___cm)] : no petechial hemorrhages [Deep Tendon Reflexes (DTR)] : deep tendon reflexes were 2+ and symmetric [Sensation] : the sensory exam was normal to light touch and pinprick [No Focal Deficits] : no focal deficits

## 2019-09-26 LAB
ALBUMIN SERPL ELPH-MCNC: 4.8 G/DL
ALP BLD-CCNC: 87 U/L
ALT SERPL-CCNC: 22 U/L
ANION GAP SERPL CALC-SCNC: 13 MMOL/L
AST SERPL-CCNC: 19 U/L
BASOPHILS # BLD AUTO: 0.03 K/UL
BASOPHILS NFR BLD AUTO: 0.4 %
BILIRUB SERPL-MCNC: 0.3 MG/DL
BUN SERPL-MCNC: 18 MG/DL
CALCIUM SERPL-MCNC: 9.3 MG/DL
CHLORIDE SERPL-SCNC: 105 MMOL/L
CO2 SERPL-SCNC: 24 MMOL/L
CREAT SERPL-MCNC: 0.85 MG/DL
CRP SERPL-MCNC: 2.79 MG/DL
EOSINOPHIL # BLD AUTO: 0.06 K/UL
EOSINOPHIL NFR BLD AUTO: 0.7 %
GLUCOSE SERPL-MCNC: 97 MG/DL
HCT VFR BLD CALC: 50 %
HGB BLD-MCNC: 15.1 G/DL
IMM GRANULOCYTES NFR BLD AUTO: 0.4 %
LYMPHOCYTES # BLD AUTO: 1.18 K/UL
LYMPHOCYTES NFR BLD AUTO: 14.4 %
MAN DIFF?: NORMAL
MCHC RBC-ENTMCNC: 24.2 PG
MCHC RBC-ENTMCNC: 30.2 GM/DL
MCV RBC AUTO: 80 FL
MONOCYTES # BLD AUTO: 0.57 K/UL
MONOCYTES NFR BLD AUTO: 7 %
NEUTROPHILS # BLD AUTO: 6.33 K/UL
NEUTROPHILS NFR BLD AUTO: 77.1 %
PLATELET # BLD AUTO: 285 K/UL
POTASSIUM SERPL-SCNC: 4.7 MMOL/L
PROT SERPL-MCNC: 7.2 G/DL
RBC # BLD: 6.25 M/UL
RBC # FLD: 14.1 %
SODIUM SERPL-SCNC: 142 MMOL/L
VIT B12 SERPL-MCNC: 419 PG/ML
WBC # FLD AUTO: 8.2 K/UL

## 2019-09-26 NOTE — HISTORY OF PRESENT ILLNESS
[Snoring] : snoring [Witnessed Apneas] : no witnessed sleep apnea [Daytime Somnolence] : no daytime somnolence [Frequent Nocturnal Awakening] : no nocturnal awakening [Unintentional Sleep while Active] : no unintentional sleep while active [Unintentional Sleep While Inactive] : no unintentional sleep while inactive [FreeTextEntry1] : 34 yr old with PMH Crohn's and GGO on CT imaging.\par \par He had a CT of the abdomen and pelvis at  radiology for the Crohns disease.  He had a full CT scan of the lung due to finding of GGO.  \par \par Pt has been having a dry cough at night when lying down the past couple months prior to sleeping.  Denies productive cough, fevers, wheezing.  He does cooking and catering for a living. He has occasional burping.\par \par colonoscopy and endoscopy 2018.   \par \par Pt snores at night but denies any daytime fatigue or apnea.\par \par PCP Dr. Salcedo in [place]

## 2019-09-26 NOTE — ASSESSMENT
[FreeTextEntry1] : Reviewed CD of CT scan from 9/22/2019  impression:  small cluster of centrilobular nodules with tree-in-bud configuration posteriorly int eh RLL suggestive of small airway infectious/inflammatory disease including JAMIN. Faint region of GGO in the medial basal segment of the RLL likely site of scarring.  Discussed with pt this could be area of inflammation, scarring, malignancy but not limited too.  Pt low risk due to non smoker, no family history of lung CA and no exposure to chemicals.  Discussed follow up with repeat CT scan in 3 months ensure resolution.

## 2019-09-27 NOTE — ASSESSMENT
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 No active freeman-anal disease at this time. Evidence of ongoing inflammation with high CRP. Developed Ab to ADA in the context of intermittent dosing and poor compliance. Clinically has adjusted to the disease and is not very symptomatic. Suspect this is due to decompression of strictured loops by fistulae. Previously exposed to a single dose of IFX, stopped due to out of pocket costs. Now on Ustekinumab maintenance with overall improvement and clinical response, but not remission. CTE revealed stable disease on Stelara, and CRP remains elevated. \par \par # advanced stricturing/ fistulizing ileocolonic CD\par - Continue UST q8 week dosing; check trough level today (week 7) and consider optimizing dose if levels < 4.5 \par - check CBC, CMP and CRP today - CRP persistently elevated which will be target to normalize if increasing dose \par - discussed injections and compliance at length\par - reviewed CTE results in detail \par - discuss surveillance cscope at f/u given colonic involvement and diagnosed 10 years ago \par \par # Enterovesicular fistula\par - Intermittent pneumaturia, however, without significant urinary symptoms or UTIs, thus low suspicion for clinically significant entero-vesicular fistula\par - Urology referral given in the spring - has not gone yet \par \par # Ground glass nodule in lung\par - cont f/u Dr. Kennedy for monitoring \par \par # HCM - will need focused HCM visit \par - Vit D 20 at last visit; complete oral repletion\par - derm visit emphasized\par - immune to Hep B\par - hep C negative \par - obesity: once acute issues sorted, can refer to endocrine; discussed importance of weight loss at this visit \par - denies tobacco use or NSAID use\par - DEXA to be discussed at next visit \par \par RTC 4 months for HCM visit

## 2019-09-27 NOTE — CONSULT LETTER
[Dear  ___] : Dear  [unfilled], [Please see my note below.] : Please see my note below. [Courtesy Letter:] : I had the pleasure of seeing your patient, [unfilled], in my office today. [Sincerely,] : Sincerely, [FreeTextEntry3] : Erik Royal MD\par Director, IBD Program\par [facility], [facility]\par \par Associate Professor of Medicine\par [employer]\par [employer] [facility]\par

## 2019-09-27 NOTE — HISTORY OF PRESENT ILLNESS
[de-identified] : 34 Y M diagnosed with psoriasis and inflammatory ileocolonic CD with likely scarring, stricturing, and multiple fistulae (entero-enteric, entero-colon, and entero-vesicular), previously on ADA however, developed Ab due to poor compliance/intermittent dosing, currently on monotherapy UST (started 2/4/19) s/p inj x 3 presents for follow-up feeling overall well, with suspected recurrence of mild symptoms before injection.\par \par CTE 9/10/19: stable colo-enteric, interloop and enterovesicular fistula. Mild fibrous stenotic disease of distal ileum. No new disease or acute findings. \par \par Patient reports overall he feels well. He can go 1 month without even "feeling like I have Crohn's disease." He denies any new symptoms - does report 2 weeks of looser stools. No abdominal pain or nausea.vomiting. No fevers/chills. \par Saw Dr. Booker since last visit for surgical consultation who advised he hold off on surgery until he is more symptomatic. \par He is compliant with his Stelara dosing and denies any ADRs to medication.\par \par Of note, CT scan revealed ground-glass nodule in left lower lobe and saw Dr. Kennedy today. Advised to repeat in 3 months for monitoring. \par \par No EIMs. \par \par IBD  history: \par He sees Dr Heck and on his last colonoscopy 2.5 yrs ago, he had a stricture (unclear where) and was recommended surgery. He says he feels okay, does not have any significant symptoms, abd pian/distention/ inc bowel sounds at times with heavy foods, but symptoms resolve over time and he goes on with his life. Also complains of some nausea and possible reflux at times. \par \par Admits he doesn’t take adalimumab on regular maintenance [id number]; unable to tell me what day his injection is...reports that prolonged injection was based on CRP control and not toward standard dosing; denies any issue of difficulty paying/obtaining Rx (though it did come up with IFX infusions)\par \par Denies EIM. No smoking. Last ADA dose was ~ 4 weeks ago. \par + anal fissures\par + psoriasis \par \par Previously had intermittent symptoms of bloating and gassiness. Diarrhea on-off. Has learned to live with them and is not bothered very much by symptoms. \par \par CTE with matted small and large bowel loops and multiple fistulae, scarring\par Colonoscopy with evidence of scarring, pseudopolyps, fistula opening seen. Strictured AC, cecal inflammatory polyps. Unable to advance to TI due to scarring \par EGD with gastric erythema, path (-) for HP\par CRP elevated at 2.5, ADA level low, Ab(+)\par \par Denies tobacco use. \par

## 2019-09-27 NOTE — PHYSICAL EXAM
[General Appearance - Alert] : alert [General Appearance - In No Acute Distress] : in no acute distress [General Appearance - Well Nourished] : well nourished [General Appearance - Well Developed] : well developed [General Appearance - Well-Appearing] : healthy appearing [Sclera] : the sclera and conjunctiva were normal [PERRL With Normal Accommodation] : pupils were equal in size, round, and reactive to light [Outer Ear] : the ears and nose were normal in appearance [Neck Appearance] : the appearance of the neck was normal [Examination Of The Oral Cavity] : the lips and gums were normal [] : no respiratory distress [Bowel Sounds] : normal bowel sounds [Abdomen Soft] : soft [Abdomen Tenderness] : non-tender [Cervical Lymph Nodes Enlarged Posterior Bilaterally] : posterior cervical [No CVA Tenderness] : no ~M costovertebral angle tenderness [Cervical Lymph Nodes Enlarged Anterior Bilaterally] : anterior cervical [No Spinal Tenderness] : no spinal tenderness [Abnormal Walk] : normal gait [Oriented To Time, Place, And Person] : oriented to person, place, and time [No Focal Deficits] : no focal deficits [Affect] : the affect was normal [Mood] : the mood was normal [FreeTextEntry1] : diffuse scaling, desquamation on ears/arms/chest, improved

## 2019-10-03 LAB
TPMT ENZYME INTERPRETATION: NORMAL
TPMT ENZYME METHODOLOGY: NORMAL
TPMT ENZYME: 19.7

## 2019-10-04 ENCOUNTER — RX RENEWAL (OUTPATIENT)
Age: 34
End: 2019-10-04

## 2019-10-10 LAB
NICOTINAMIDE: 12.5 NG/ML
NICOTINIC ACID: <5 NG/ML

## 2019-12-09 ENCOUNTER — MOBILE ON CALL (OUTPATIENT)
Age: 34
End: 2019-12-09

## 2019-12-10 ENCOUNTER — RX RENEWAL (OUTPATIENT)
Age: 34
End: 2019-12-10

## 2019-12-10 ENCOUNTER — OTHER (OUTPATIENT)
Age: 34
End: 2019-12-10

## 2019-12-10 DIAGNOSIS — M54.9 DORSALGIA, UNSPECIFIED: ICD-10-CM

## 2019-12-16 ENCOUNTER — APPOINTMENT (OUTPATIENT)
Dept: GASTROENTEROLOGY | Facility: CLINIC | Age: 34
End: 2019-12-16
Payer: COMMERCIAL

## 2019-12-16 VITALS
BODY MASS INDEX: 44.56 KG/M2 | HEART RATE: 100 BPM | SYSTOLIC BLOOD PRESSURE: 145 MMHG | WEIGHT: 294 LBS | OXYGEN SATURATION: 96 % | DIASTOLIC BLOOD PRESSURE: 81 MMHG | RESPIRATION RATE: 16 BRPM | HEIGHT: 68 IN | TEMPERATURE: 98.4 F

## 2019-12-16 PROCEDURE — 99214 OFFICE O/P EST MOD 30 MIN: CPT

## 2019-12-17 NOTE — PHYSICAL EXAM
[General Appearance - Alert] : alert [General Appearance - In No Acute Distress] : in no acute distress [General Appearance - Well Nourished] : well nourished [General Appearance - Well Developed] : well developed [General Appearance - Well-Appearing] : healthy appearing [Sclera] : the sclera and conjunctiva were normal [PERRL With Normal Accommodation] : pupils were equal in size, round, and reactive to light [Outer Ear] : the ears and nose were normal in appearance [Examination Of The Oral Cavity] : the lips and gums were normal [Neck Appearance] : the appearance of the neck was normal [] : the neck was supple [Bowel Sounds] : normal bowel sounds [Abdomen Soft] : soft [Cervical Lymph Nodes Enlarged Posterior Bilaterally] : posterior cervical [Abdomen Tenderness] : non-tender [Cervical Lymph Nodes Enlarged Anterior Bilaterally] : anterior cervical [No CVA Tenderness] : no ~M costovertebral angle tenderness [No Spinal Tenderness] : no spinal tenderness [Abnormal Walk] : normal gait [No Focal Deficits] : no focal deficits [Oriented To Time, Place, And Person] : oriented to person, place, and time [Affect] : the affect was normal [Mood] : the mood was normal [FreeTextEntry1] : diffuse scaling, desquamation on ears/arms/chest, improved

## 2019-12-17 NOTE — ASSESSMENT
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 No active freeman-anal disease at this time. Evidence of ongoing inflammation with high CRP. Developed Ab to ADA in the context of intermittent dosing and poor compliance. Clinically has adjusted to the disease and is not very symptomatic. Suspect this is due to decompression of strictured loops by fistulae. Previously exposed to a single dose of IFX, stopped due to out of pocket costs. Now on Ustekinumab maintenance with overall improvement and clinical response, but not remission.  Now presents on treatment for recent UTI, with persistent but improved back pain, E coli on urine culture suspicious for enterovesicular fistula.\par \par # advanced stricturing/ fistulizing ileocolonic CD\par - Continue UST q8 week dosing; got approved for Q 4 week dosing however given new UTI will hold off on escalation until he sees urology\par - repreat CTE - most recent one in Sept reports stable fistulas however 2 weeks ago developed 103F fever, with positive urine culture for ecoli - concerned for worsening disease/fistula opening to bladder\par - cbc, cmp and crp with PCP 2 weeks ago; CRP elevated; will repeat once off antibiotics at future visit \par - discuss surveillance cscope at f/u given colonic involvement and diagnosed 10 years ago \par - had seen dr. Booker in past while asymptomatic \par \par # Enterovesicular fistula?\par - Intermittent pneumaturia, now with first UTI in 5 years, reevaluate CTE\par - Urology referral given and re-emphasized (gave referral in spring as well but never went)\par - has had cystoscopy > 5 years ago, reports was normal \par \par # Ground glass nodule in lung\par - cont f/u Dr. Kennedy for monitoring \par \par # HCM - will need focused HCM visit \par - Vit D 20 at last visit; completed oral repletion\par - derm visit emphasized\par - immune to Hep B\par - hep C negative \par - obesity: once acute issues sorted, can refer to endocrine; discussed importance of weight loss at this visit \par - denies tobacco use or NSAID use\par - DEXA to be discussed at next visit \par \par RTC post CTE and urology consult \par Discuss at IBD conf.

## 2019-12-17 NOTE — CONSULT LETTER
[Dear  ___] : Dear  [unfilled], [Courtesy Letter:] : I had the pleasure of seeing your patient, [unfilled], in my office today. [Please see my note below.] : Please see my note below. [Sincerely,] : Sincerely, [FreeTextEntry3] : Erik Royal MD\par Director, IBD Program\par [facility]\par \par Associate Professor of Medicine\par [employer]\par [employer] at [facility]\par  [DrPriti  ___] : Dr. CARO

## 2019-12-17 NOTE — HISTORY OF PRESENT ILLNESS
[de-identified] : 34 Y M diagnosed with psoriasis and inflammatory ileocolonic CD with likely scarring, stricturing, and multiple fistulae (entero-enteric, entero-colon, and entero-vesicular), previously on ADA however, developed Ab due to poor compliance/intermittent dosing, currently on monotherapy UST (started 2/4/19), now here for UTI diagnosed last week, with persistent b/l flank pain.\par \par Pt reports about 2 1/2 weeks ago he felt feverish, took a rectal temp and it was 103F. Was also having b/l flank pain. Saw PCP - labs and urine performed, was told all normal. However upon receiving results, urine culture came back positive for E.coli with susceptibility testing. He's been on keflex since last week with overall improvement in well-being, but persistent migratory back pain. Reports had similar pain in the summer, when we gave him laxatives and miralax for flare/constipation which did improve his symptoms. \par \par CTE 9/10/19: stable colo-enteric, interloop and enterovesicular fistula. Mild fibrous stenotic disease of distal ileum. No new disease or acute findings. \par \par No abdominal pain or nausea.vomiting. \par Saw Dr. Booker since last visit for surgical consultation who advised he hold off on surgery until he is more symptomatic. \par He is compliant with his Stelara dosing and denies any ADRs to medication.\par \par Of note, CT scan revealed ground-glass nodule in left lower lobe and saw Dr. Kennedy. Advised to repeat in 3 months for monitoring. \par \par No EIMs. \par \par IBD  history: \par He sees Dr Heck and on his last colonoscopy 2.5 yrs ago, he had a stricture (unclear where) and was recommended surgery. He says he feels okay, does not have any significant symptoms, abd pian/distention/ inc bowel sounds at times with heavy foods, but symptoms resolve over time and he goes on with his life. Also complains of some nausea and possible reflux at times. \par \par Admits he doesn’t take adalimumab on regular maintenance [id number]; unable to tell me what day his injection is...reports that prolonged injection was based on CRP control and not toward standard dosing; denies any issue of difficulty paying/obtaining Rx (though it did come up with IFX infusions)\par \par Denies EIM. No smoking. Last ADA dose was ~ 4 weeks ago. \par + anal fissures\par + psoriasis \par \par Previously had intermittent symptoms of bloating and gassiness. Diarrhea on-off. Has learned to live with them and is not bothered very much by symptoms. \par \par CTE with matted small and large bowel loops and multiple fistulae, scarring\par Colonoscopy with evidence of scarring, pseudopolyps, fistula opening seen. Strictured AC, cecal inflammatory polyps. Unable to advance to TI due to scarring \par EGD with gastric erythema, path (-) for HP\par CRP elevated at 2.5, ADA level low, Ab(+)\par \par Denies tobacco use. \par

## 2019-12-23 ENCOUNTER — APPOINTMENT (OUTPATIENT)
Dept: CT IMAGING | Facility: CLINIC | Age: 34
End: 2019-12-23
Payer: COMMERCIAL

## 2019-12-23 ENCOUNTER — RX RENEWAL (OUTPATIENT)
Age: 34
End: 2019-12-23

## 2019-12-23 ENCOUNTER — OUTPATIENT (OUTPATIENT)
Dept: OUTPATIENT SERVICES | Facility: HOSPITAL | Age: 34
LOS: 1 days | End: 2019-12-23

## 2019-12-23 PROCEDURE — 74177 CT ABD & PELVIS W/CONTRAST: CPT | Mod: 26

## 2019-12-26 ENCOUNTER — MOBILE ON CALL (OUTPATIENT)
Age: 34
End: 2019-12-26

## 2020-01-09 ENCOUNTER — APPOINTMENT (OUTPATIENT)
Dept: UROLOGY | Facility: CLINIC | Age: 35
End: 2020-01-09
Payer: COMMERCIAL

## 2020-01-09 VITALS — DIASTOLIC BLOOD PRESSURE: 98 MMHG | SYSTOLIC BLOOD PRESSURE: 152 MMHG | TEMPERATURE: 98.8 F | HEART RATE: 101 BPM

## 2020-01-09 PROCEDURE — 99204 OFFICE O/P NEW MOD 45 MIN: CPT

## 2020-01-09 NOTE — HISTORY OF PRESENT ILLNESS
[Fever] : fever [FreeTextEntry1] : 34M 10yr h/o Crohns dx presents with concern for enterovesicular fistula. 2 UTI in last 10yrs. Last one was 1 mo ago, treated with keflex to cipro. Now on cipro, tindamax.  Temp last month 100-101 this month. Denies dysuria, hematuria. Pneumaturia a month ago and intermittently within last 2-3 yrs. Past h/o fecaluria. IPSS 4. c/o b/l lower back pain, achy, past 1 mo. no testuclar pain. \par \par PMHx: Crohns\par PSHx: Denies\par NKDA\par Sx: No toxic habits\par Fx: No  malignancies [Urinary Retention] : no urinary retention [Urinary Incontinence] : no urinary incontinence [Straining] : no straining [Nocturia] : no nocturia [Weak Stream] : no weak stream [Intermittency] : no intermittency [Dysuria] : no dysuria [Abdominal Pain] : no abdominal pain [Hematuria - Gross] : no gross hematuria [Flank Pain] : no flank pain [Nausea] : no nausea

## 2020-01-09 NOTE — ASSESSMENT
[FreeTextEntry1] : enterovesicular fistula\par history pneumaturia\par feels better right now\par +previous flare up\par CT enterogram confirming fistula\par for cystoscopy\par UA UCX today\par will only treat if symptomatic

## 2020-01-09 NOTE — PHYSICAL EXAM
[Normal Appearance] : normal appearance [General Appearance - Well Nourished] : well nourished [General Appearance - Well Developed] : well developed [Well Groomed] : well groomed [General Appearance - In No Acute Distress] : no acute distress [Edema] : no peripheral edema [] : no respiratory distress [Abdomen Soft] : soft [Abdomen Tenderness] : non-tender [Penis Abnormality] : normal circumcised penis [Epididymis] : the epididymides were normal [Scrotum] : the scrotum was normal [Testes Tenderness] : no tenderness of the testes [Testes Mass (___cm)] : there were no testicular masses [Normal Station and Gait] : the gait and station were normal for the patient's age [Skin Color & Pigmentation] : normal skin color and pigmentation [Oriented To Time, Place, And Person] : oriented to person, place, and time [No Focal Deficits] : no focal deficits [No Palpable Adenopathy] : no palpable adenopathy

## 2020-01-09 NOTE — REVIEW OF SYSTEMS
[Fever] : fever [Constipation] : constipation [Diarrhea] : diarrhea [Chills] : no chills [Earache] : no earache [Chest Pain] : no chest pain [Shortness Of Breath] : no shortness of breath [Abdominal Pain] : no abdominal pain [Vomiting] : no vomiting [Dysuria] : no dysuria [Hesitancy] : no urinary hesitancy [Nocturia] : no nocturia [Testicular Pain] : no testicular pain

## 2020-01-13 ENCOUNTER — APPOINTMENT (OUTPATIENT)
Dept: COLORECTAL SURGERY | Facility: CLINIC | Age: 35
End: 2020-01-13
Payer: MEDICAID

## 2020-01-13 VITALS
HEIGHT: 68 IN | TEMPERATURE: 98.6 F | BODY MASS INDEX: 44.56 KG/M2 | HEART RATE: 80 BPM | DIASTOLIC BLOOD PRESSURE: 82 MMHG | WEIGHT: 294 LBS | SYSTOLIC BLOOD PRESSURE: 135 MMHG

## 2020-01-13 PROCEDURE — 99215 OFFICE O/P EST HI 40 MIN: CPT

## 2020-01-13 NOTE — PHYSICAL EXAM
[Abdomen Masses] : No abdominal masses [Abdomen Tenderness] : ~T No ~M abdominal tenderness [No HSM] : no hepatosplenomegaly [JVD] : no jugular venous distention  [No Rash or Lesion] : No rash or lesion [Calm] : calm [Alert] : alert

## 2020-01-13 NOTE — HISTORY OF PRESENT ILLNESS
[FreeTextEntry1] : 33 y/o M presents for evaluation of fistulizing Crohn's disease, referred by Dr. Royal\par Diagnosed ~ 10 years ago, has been on Humira, Remicade in the past. On Stelara but stopped due to infection per patient \par \par Colonoscopy completed 5/23/16 with Dr. Heck, stricture noted at 60 cm\par EGD and Colonoscopy completed 12/18/18 with Dr. Royal\par - Gastritis, duodenitis\par - healed mucosa in most of examined colon\par - fistula in sigmoid colon\par - inflammatory pseudopolyps\par - stricture of ascending colon vs cecal inflammatory polyps, unable to traverse\par \par CT enterography completed 12/23/19\par - since 9/10/19, there are again multiple complex fistulas involving the sigmoid colon and distal/terminal ileum as well as an enterovesicular fistula\par - there is now air in the urinary bladder compatible with fistula and UTI\par \par Recently evaluated by Dr. Van, urologist, for enterovesical fistula. Scheduled for CT enterogram and cystoscopy. Previously treated with Cephalexin for UTI symptoms\par Recent urine culture showed 50-99,000 CFU/ML of E Coli. \par \par Denies rectal or flank pain. C/o intermittent abdominal pain, described as feeling though he is constipated\par Reports low grade fever that he is monitoring at home, ~ 100 F. Denies N/V, difficulty urinating\par Reports decreased energy level and appetite. Attributes to tinidazole and cipro, prescribed by an outside GI,currently taking    \par BH: Several times daily. Soft and formed to diarrhea\par Denies mucous or bleeding with BM's

## 2020-01-13 NOTE — ASSESSMENT
[FreeTextEntry1] : I reviewed with the patient and his father that the findings on his examination and workup consistent with a complex enterocolonic vesical fistula with significant Crohn's disease. As a result of his recent urinary tract infection and failure of medical therapy recommended that he consider receiving the plans for a definitive resection. I would anticipate he'll require a robotic possible open (morbid obesity with high risk of conversion to open surgery) takedown of the fistula with a sigmoid resection, ileocolic resection, bladder repair, and diverting ileostomy creation.\par \par \par The associated risks, benefits, alternatives of the procedure have been outlined discussed and reviewed with the patient's family. These risks including but not limited to bleeding, infection, anastomotic leak, need for secondary surgery, need for ileostomy or colostomy creation, change in bowel habits, change in urinary function, nerve injury, change in sexual function, as well as the risk of heart and lung complications , DVT, PE, infection and death were detailed. The patient understands these risks and consents the planned procedure. Appropriate  literature regarding surgery and post operative treatment/complications and enhanced recovery pathway has been detailed and reviewed. Consent was obtained. All questions were answered.\par

## 2020-01-14 RX ORDER — CEPHALEXIN 500 MG/1
500 TABLET ORAL
Qty: 28 | Refills: 0 | Status: DISCONTINUED | COMMUNITY
Start: 2019-12-10 | End: 2020-01-14

## 2020-01-15 LAB
APPEARANCE: ABNORMAL
BACTERIA UR CULT: ABNORMAL
BACTERIA: NEGATIVE
BILIRUBIN URINE: NEGATIVE
BLOOD URINE: ABNORMAL
COLOR: YELLOW
GLUCOSE QUALITATIVE U: NEGATIVE
HYALINE CASTS: 6 /LPF
KETONES URINE: NEGATIVE
LEUKOCYTE ESTERASE URINE: ABNORMAL
MICROSCOPIC-UA: NORMAL
NITRITE URINE: NEGATIVE
PH URINE: 6
PROTEIN URINE: NEGATIVE
RED BLOOD CELLS URINE: 2 /HPF
SPECIFIC GRAVITY URINE: 1.02
SQUAMOUS EPITHELIAL CELLS: 2 /HPF
UROBILINOGEN URINE: NORMAL
WHITE BLOOD CELLS URINE: 56 /HPF

## 2020-04-27 ENCOUNTER — APPOINTMENT (OUTPATIENT)
Dept: GASTROENTEROLOGY | Facility: CLINIC | Age: 35
End: 2020-04-27
Payer: COMMERCIAL

## 2020-04-27 PROCEDURE — 99442: CPT

## 2020-06-07 ENCOUNTER — LABORATORY RESULT (OUTPATIENT)
Age: 35
End: 2020-06-07

## 2020-06-08 ENCOUNTER — TRANSCRIPTION ENCOUNTER (OUTPATIENT)
Age: 35
End: 2020-06-08

## 2020-06-08 VITALS
HEIGHT: 65 IN | WEIGHT: 301.59 LBS | RESPIRATION RATE: 16 BRPM | OXYGEN SATURATION: 95 % | TEMPERATURE: 97 F | HEART RATE: 83 BPM | DIASTOLIC BLOOD PRESSURE: 77 MMHG | SYSTOLIC BLOOD PRESSURE: 125 MMHG

## 2020-06-09 ENCOUNTER — APPOINTMENT (OUTPATIENT)
Dept: COLORECTAL SURGERY | Facility: HOSPITAL | Age: 35
End: 2020-06-09

## 2020-06-09 ENCOUNTER — INPATIENT (INPATIENT)
Facility: HOSPITAL | Age: 35
LOS: 6 days | Discharge: HOME HEALTH | DRG: 330 | End: 2020-06-16
Attending: SURGERY | Admitting: SURGERY
Payer: COMMERCIAL

## 2020-06-09 ENCOUNTER — RESULT REVIEW (OUTPATIENT)
Age: 35
End: 2020-06-09

## 2020-06-09 LAB
ANION GAP SERPL CALC-SCNC: 13 MMOL/L — SIGNIFICANT CHANGE UP (ref 5–17)
BUN SERPL-MCNC: 16 MG/DL — SIGNIFICANT CHANGE UP (ref 7–23)
CALCIUM SERPL-MCNC: 8.5 MG/DL — SIGNIFICANT CHANGE UP (ref 8.4–10.5)
CHLORIDE SERPL-SCNC: 106 MMOL/L — SIGNIFICANT CHANGE UP (ref 96–108)
CO2 SERPL-SCNC: 20 MMOL/L — LOW (ref 22–31)
CREAT SERPL-MCNC: 0.9 MG/DL — SIGNIFICANT CHANGE UP (ref 0.5–1.3)
GLUCOSE BLDC GLUCOMTR-MCNC: 137 MG/DL — HIGH (ref 70–99)
GLUCOSE BLDC GLUCOMTR-MCNC: 94 MG/DL — SIGNIFICANT CHANGE UP (ref 70–99)
GLUCOSE SERPL-MCNC: 153 MG/DL — HIGH (ref 70–99)
HCT VFR BLD CALC: 46.1 % — SIGNIFICANT CHANGE UP (ref 39–50)
HGB BLD-MCNC: 14.5 G/DL — SIGNIFICANT CHANGE UP (ref 13–17)
MAGNESIUM SERPL-MCNC: 1.8 MG/DL — SIGNIFICANT CHANGE UP (ref 1.6–2.6)
MCHC RBC-ENTMCNC: 24.5 PG — LOW (ref 27–34)
MCHC RBC-ENTMCNC: 31.5 GM/DL — LOW (ref 32–36)
MCV RBC AUTO: 77.7 FL — LOW (ref 80–100)
NRBC # BLD: 0 /100 WBCS — SIGNIFICANT CHANGE UP (ref 0–0)
PHOSPHATE SERPL-MCNC: 3.6 MG/DL — SIGNIFICANT CHANGE UP (ref 2.5–4.5)
PLATELET # BLD AUTO: 285 K/UL — SIGNIFICANT CHANGE UP (ref 150–400)
POTASSIUM SERPL-MCNC: 4.6 MMOL/L — SIGNIFICANT CHANGE UP (ref 3.5–5.3)
POTASSIUM SERPL-SCNC: 4.6 MMOL/L — SIGNIFICANT CHANGE UP (ref 3.5–5.3)
RBC # BLD: 5.93 M/UL — HIGH (ref 4.2–5.8)
RBC # FLD: 14.7 % — HIGH (ref 10.3–14.5)
SODIUM SERPL-SCNC: 139 MMOL/L — SIGNIFICANT CHANGE UP (ref 135–145)
WBC # BLD: 11.92 K/UL — HIGH (ref 3.8–10.5)
WBC # FLD AUTO: 11.92 K/UL — HIGH (ref 3.8–10.5)

## 2020-06-09 PROCEDURE — 45330 DIAGNOSTIC SIGMOIDOSCOPY: CPT | Mod: GC

## 2020-06-09 PROCEDURE — 44660 REPAIR BOWEL-BLADDER FISTULA: CPT

## 2020-06-09 PROCEDURE — 44207 L COLECTOMY/COLOPROCTOSTOMY: CPT | Mod: 82

## 2020-06-09 PROCEDURE — 88307 TISSUE EXAM BY PATHOLOGIST: CPT | Mod: 26

## 2020-06-09 PROCEDURE — 44160 REMOVAL OF COLON: CPT | Mod: 59

## 2020-06-09 PROCEDURE — 88304 TISSUE EXAM BY PATHOLOGIST: CPT | Mod: 26

## 2020-06-09 PROCEDURE — 44145 PARTIAL REMOVAL OF COLON: CPT

## 2020-06-09 RX ORDER — ALVIMOPAN 12 MG/1
12 CAPSULE ORAL
Refills: 0 | Status: COMPLETED | OUTPATIENT
Start: 2020-06-09 | End: 2020-06-16

## 2020-06-09 RX ORDER — HEPARIN SODIUM 5000 [USP'U]/ML
5000 INJECTION INTRAVENOUS; SUBCUTANEOUS ONCE
Refills: 0 | Status: COMPLETED | OUTPATIENT
Start: 2020-06-09 | End: 2020-06-09

## 2020-06-09 RX ORDER — HYDROMORPHONE HYDROCHLORIDE 2 MG/ML
0.5 INJECTION INTRAMUSCULAR; INTRAVENOUS; SUBCUTANEOUS EVERY 4 HOURS
Refills: 0 | Status: DISCONTINUED | OUTPATIENT
Start: 2020-06-09 | End: 2020-06-11

## 2020-06-09 RX ORDER — ACETAMINOPHEN 500 MG
1000 TABLET ORAL ONCE
Refills: 0 | Status: COMPLETED | OUTPATIENT
Start: 2020-06-09 | End: 2020-06-09

## 2020-06-09 RX ORDER — HEPARIN SODIUM 5000 [USP'U]/ML
7500 INJECTION INTRAVENOUS; SUBCUTANEOUS EVERY 8 HOURS
Refills: 0 | Status: DISCONTINUED | OUTPATIENT
Start: 2020-06-09 | End: 2020-06-16

## 2020-06-09 RX ORDER — GABAPENTIN 400 MG/1
600 CAPSULE ORAL ONCE
Refills: 0 | Status: COMPLETED | OUTPATIENT
Start: 2020-06-09 | End: 2020-06-09

## 2020-06-09 RX ORDER — SODIUM CHLORIDE 9 MG/ML
1000 INJECTION, SOLUTION INTRAVENOUS
Refills: 0 | Status: DISCONTINUED | OUTPATIENT
Start: 2020-06-09 | End: 2020-06-12

## 2020-06-09 RX ORDER — ALVIMOPAN 12 MG/1
12 CAPSULE ORAL ONCE
Refills: 0 | Status: COMPLETED | OUTPATIENT
Start: 2020-06-09 | End: 2020-06-09

## 2020-06-09 RX ORDER — HYDROMORPHONE HYDROCHLORIDE 2 MG/ML
0.5 INJECTION INTRAMUSCULAR; INTRAVENOUS; SUBCUTANEOUS ONCE
Refills: 0 | Status: DISCONTINUED | OUTPATIENT
Start: 2020-06-09 | End: 2020-06-09

## 2020-06-09 RX ORDER — MAGNESIUM SULFATE 500 MG/ML
2 VIAL (ML) INJECTION ONCE
Refills: 0 | Status: COMPLETED | OUTPATIENT
Start: 2020-06-09 | End: 2020-06-09

## 2020-06-09 RX ORDER — MAGNESIUM SULFATE 500 MG/ML
1 VIAL (ML) INJECTION ONCE
Refills: 0 | Status: COMPLETED | OUTPATIENT
Start: 2020-06-09 | End: 2020-06-09

## 2020-06-09 RX ORDER — ACETAMINOPHEN 500 MG
1000 TABLET ORAL EVERY 6 HOURS
Refills: 0 | Status: DISCONTINUED | OUTPATIENT
Start: 2020-06-09 | End: 2020-06-16

## 2020-06-09 RX ORDER — BUPIVACAINE 13.3 MG/ML
20 INJECTION, SUSPENSION, LIPOSOMAL INFILTRATION ONCE
Refills: 0 | Status: DISCONTINUED | OUTPATIENT
Start: 2020-06-09 | End: 2020-06-16

## 2020-06-09 RX ORDER — ONDANSETRON 8 MG/1
4 TABLET, FILM COATED ORAL EVERY 6 HOURS
Refills: 0 | Status: DISCONTINUED | OUTPATIENT
Start: 2020-06-09 | End: 2020-06-16

## 2020-06-09 RX ORDER — KETOROLAC TROMETHAMINE 30 MG/ML
15 SYRINGE (ML) INJECTION EVERY 6 HOURS
Refills: 0 | Status: DISCONTINUED | OUTPATIENT
Start: 2020-06-09 | End: 2020-06-12

## 2020-06-09 RX ADMIN — ALVIMOPAN 12 MILLIGRAM(S): 12 CAPSULE ORAL at 17:17

## 2020-06-09 RX ADMIN — Medication 15 MILLIGRAM(S): at 17:16

## 2020-06-09 RX ADMIN — HEPARIN SODIUM 7500 UNIT(S): 5000 INJECTION INTRAVENOUS; SUBCUTANEOUS at 19:30

## 2020-06-09 RX ADMIN — Medication 1000 MILLIGRAM(S): at 23:12

## 2020-06-09 RX ADMIN — HEPARIN SODIUM 5000 UNIT(S): 5000 INJECTION INTRAVENOUS; SUBCUTANEOUS at 06:30

## 2020-06-09 RX ADMIN — Medication 1000 MILLIGRAM(S): at 06:29

## 2020-06-09 RX ADMIN — Medication 50 GRAM(S): at 13:43

## 2020-06-09 RX ADMIN — Medication 1000 MILLIGRAM(S): at 17:17

## 2020-06-09 RX ADMIN — Medication 15 MILLIGRAM(S): at 23:12

## 2020-06-09 RX ADMIN — Medication 400 MILLIGRAM(S): at 12:59

## 2020-06-09 RX ADMIN — HYDROMORPHONE HYDROCHLORIDE 0.5 MILLIGRAM(S): 2 INJECTION INTRAMUSCULAR; INTRAVENOUS; SUBCUTANEOUS at 21:17

## 2020-06-09 RX ADMIN — GABAPENTIN 600 MILLIGRAM(S): 400 CAPSULE ORAL at 06:29

## 2020-06-09 RX ADMIN — Medication 100 GRAM(S): at 18:02

## 2020-06-09 RX ADMIN — ALVIMOPAN 12 MILLIGRAM(S): 12 CAPSULE ORAL at 06:29

## 2020-06-09 NOTE — PROGRESS NOTE ADULT - ASSESSMENT
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 Presented on 6/9 for elective colon resection & creation of ileostomy. Now s/p ex lap, ileocecal & sigmoid resection, creation of ileocolic anastomosis & diverting loop ileostomy, as well as primary cystorraphy done by Urology, POD 0    ERAS Protocol  CLD  LR 40  Pain/Nausea control  Oconnor - closely follow UOP in setting of primary bladder repair  NIDA    Post-op Labs  AM Labs  OOBA/SCD/IS  SQH

## 2020-06-09 NOTE — H&P ADULT - ASSESSMENT
34 year old male with PMH of Crohn's disease recently on Stelara, presents for elective colon resection and ileostomy creation    Plan:  To OR  admission post op  ERAS protocol  Colon bundle

## 2020-06-09 NOTE — BRIEF OPERATIVE NOTE - OPERATION/FINDINGS
Exploratory laparotomy with identification of terminal ileum to sigmoid colon to bladder fistula. Sharp adhesiolysis of dense adhesions and shaved off bladder dome. Intraoperative  consult with primary cystorraphy. Bladder filled with 300cc of methylene blue/saline and no leak appreciated. Sigmoid resection with low ligation of the mesentery. Primary colorectal anastomosis created with 31mm transanal EEA circular stapler at 20cm. Flexible sigmoidoscopy showing patent and hemostatic anastomosis with negative air leak test. Portion of omentum placed in between colorectal anastomosis and bladder repair. Ileocolic resection with 25cm of terminal ileum included due to significant creeping fat. Creation of side-to-side function end-to-end ileocolic anastomosis using Gibran technique (2 purple endoGIAs and common enterotomy closed with blue linear stapler. Diverting loop ileostomy brought up in the right abdomen in predetermined location. Hemostasis achieved at the end of the case. NIDA placed in the pelvis and exiting in the LLQ. Colorectal closing tray utilized and fascia closed primarily with interrupted #1PDS. Subcutaneous NIDA placed exiting in the RLQ and skin closed primarily with staples. Loop ileostomy matured.

## 2020-06-09 NOTE — H&P ADULT - NSHPPHYSICALEXAM_GEN_ALL_CORE
Constitutional: WDWN NAD  Heart: S1 S2  Lungs: breathing with minimal effort, no use of accessory muscles  Abdomen: soft, nontender, nondistended, without masses, ecchymosis, erythema

## 2020-06-09 NOTE — H&P ADULT - HISTORY OF PRESENT ILLNESS
34 year old male with PMH of Crohn's disease recently on Stelara, presents for elective colon resection. Patient recently underwent EGD and colonoscopy on 12/18/18 which found a stricture of ascending colon vs cecal inflammatory polyps that was unable to transverse. Further examination with a CT enterography on 12/23/19 showed multiple complex fistulas involving the sigmoid colon and distal terminal ileum as well as an enterovesicular fistula. Currently patient has no complaints.

## 2020-06-09 NOTE — BRIEF OPERATIVE NOTE - NSICDXBRIEFPROCEDURE_GEN_ALL_CORE_FT
PROCEDURES:  Ileocecal resection 09-Jun-2020 13:40:02  Mike Xavier  Open resection, sigmoid colon 09-Jun-2020 13:38:41  Mike Xavier

## 2020-06-09 NOTE — PROGRESS NOTE ADULT - SUBJECTIVE AND OBJECTIVE BOX
POST-OPERATIVE NOTE    Procedure: Ex lap, Ileocecal & sigmoid resection; creation of diverting loop ileostomy; primary cystorraphy by urology    Diagnosis/Indication: Fistulizing & Stricturing Crohn's    Surgeon: Dr. Booker, Dr. Shaw    S: Pt complaining of pain, not at abdominal incisions or surgical site but in his hands. No complaints of weakness or altered sensation in either distal UE. Denies any nausea or emesis. No gas or output from ileostomy. Has not had any of the CLD. No suprapubic discomfort or issues with golden; of note, patient has not voided.    O:  T(C): 36.8 (06-09-20 @ 20:10), Max: 36.8 (06-09-20 @ 20:10)  T(F): 98.2 (06-09-20 @ 20:10), Max: 98.2 (06-09-20 @ 20:10)  HR: 97 (06-09-20 @ 20:10) (97 - 97)  BP: 117/81 (06-09-20 @ 20:10) (117/81 - 117/81)  RR: 18 (06-09-20 @ 20:10) (18 - 18)  SpO2: 95% (06-09-20 @ 20:10) (95% - 95%)  Wt(kg): --                        14.5   11.92 )-----------( 285      ( 09 Jun 2020 12:59 )             46.1     06-09    139  |  106  |  16  ----------------------------<  153<H>  4.6   |  20<L>  |  0.90    Ca    8.5      09 Jun 2020 12:59  Phos  3.6     06-09  Mg     1.8     06-09        Gen: NAD  C/V: NSR  Pulm: Nonlabored breathing, no respiratory distress  Abd: soft, obese abdomen; ex-lap incision with island dressing, C/D/I. LLQ Pelvic NIDA w/ scant SS o/p. RLQ fascial/subcutaneous NIDA w/o any output. No urine in golden at this time. Loop ileostomy stoma.   Extrem: WWP, SCDs in place

## 2020-06-10 LAB
A1C WITH ESTIMATED AVERAGE GLUCOSE RESULT: 5.8 % — HIGH (ref 4–5.6)
ANION GAP SERPL CALC-SCNC: 12 MMOL/L — SIGNIFICANT CHANGE UP (ref 5–17)
BASOPHILS # BLD AUTO: 0.02 K/UL — SIGNIFICANT CHANGE UP (ref 0–0.2)
BASOPHILS NFR BLD AUTO: 0.2 % — SIGNIFICANT CHANGE UP (ref 0–2)
BUN SERPL-MCNC: 14 MG/DL — SIGNIFICANT CHANGE UP (ref 7–23)
CALCIUM SERPL-MCNC: 8.5 MG/DL — SIGNIFICANT CHANGE UP (ref 8.4–10.5)
CHLORIDE SERPL-SCNC: 102 MMOL/L — SIGNIFICANT CHANGE UP (ref 96–108)
CO2 SERPL-SCNC: 24 MMOL/L — SIGNIFICANT CHANGE UP (ref 22–31)
CREAT SERPL-MCNC: 0.86 MG/DL — SIGNIFICANT CHANGE UP (ref 0.5–1.3)
EOSINOPHIL # BLD AUTO: 0.02 K/UL — SIGNIFICANT CHANGE UP (ref 0–0.5)
EOSINOPHIL NFR BLD AUTO: 0.2 % — SIGNIFICANT CHANGE UP (ref 0–6)
ESTIMATED AVERAGE GLUCOSE: 120 MG/DL — HIGH (ref 68–114)
GLUCOSE SERPL-MCNC: 128 MG/DL — HIGH (ref 70–99)
HCT VFR BLD CALC: 42.9 % — SIGNIFICANT CHANGE UP (ref 39–50)
HGB BLD-MCNC: 13.4 G/DL — SIGNIFICANT CHANGE UP (ref 13–17)
IMM GRANULOCYTES NFR BLD AUTO: 0.4 % — SIGNIFICANT CHANGE UP (ref 0–1.5)
LYMPHOCYTES # BLD AUTO: 1.62 K/UL — SIGNIFICANT CHANGE UP (ref 1–3.3)
LYMPHOCYTES # BLD AUTO: 13.9 % — SIGNIFICANT CHANGE UP (ref 13–44)
MAGNESIUM SERPL-MCNC: 2.6 MG/DL — SIGNIFICANT CHANGE UP (ref 1.6–2.6)
MCHC RBC-ENTMCNC: 24.2 PG — LOW (ref 27–34)
MCHC RBC-ENTMCNC: 31.2 GM/DL — LOW (ref 32–36)
MCV RBC AUTO: 77.6 FL — LOW (ref 80–100)
MONOCYTES # BLD AUTO: 0.79 K/UL — SIGNIFICANT CHANGE UP (ref 0–0.9)
MONOCYTES NFR BLD AUTO: 6.8 % — SIGNIFICANT CHANGE UP (ref 2–14)
NEUTROPHILS # BLD AUTO: 9.17 K/UL — HIGH (ref 1.8–7.4)
NEUTROPHILS NFR BLD AUTO: 78.5 % — HIGH (ref 43–77)
NRBC # BLD: 0 /100 WBCS — SIGNIFICANT CHANGE UP (ref 0–0)
PHOSPHATE SERPL-MCNC: 3.8 MG/DL — SIGNIFICANT CHANGE UP (ref 2.5–4.5)
PLATELET # BLD AUTO: 239 K/UL — SIGNIFICANT CHANGE UP (ref 150–400)
POTASSIUM SERPL-MCNC: 4.1 MMOL/L — SIGNIFICANT CHANGE UP (ref 3.5–5.3)
POTASSIUM SERPL-SCNC: 4.1 MMOL/L — SIGNIFICANT CHANGE UP (ref 3.5–5.3)
RBC # BLD: 5.53 M/UL — SIGNIFICANT CHANGE UP (ref 4.2–5.8)
RBC # FLD: 15.2 % — HIGH (ref 10.3–14.5)
SODIUM SERPL-SCNC: 138 MMOL/L — SIGNIFICANT CHANGE UP (ref 135–145)
WBC # BLD: 11.67 K/UL — HIGH (ref 3.8–10.5)
WBC # FLD AUTO: 11.67 K/UL — HIGH (ref 3.8–10.5)

## 2020-06-10 RX ORDER — PANTOPRAZOLE SODIUM 20 MG/1
40 TABLET, DELAYED RELEASE ORAL
Refills: 0 | Status: DISCONTINUED | OUTPATIENT
Start: 2020-06-10 | End: 2020-06-16

## 2020-06-10 RX ORDER — OXYCODONE HYDROCHLORIDE 5 MG/1
2.5 TABLET ORAL EVERY 4 HOURS
Refills: 0 | Status: DISCONTINUED | OUTPATIENT
Start: 2020-06-10 | End: 2020-06-11

## 2020-06-10 RX ORDER — OXYCODONE HYDROCHLORIDE 5 MG/1
5 TABLET ORAL EVERY 4 HOURS
Refills: 0 | Status: DISCONTINUED | OUTPATIENT
Start: 2020-06-10 | End: 2020-06-11

## 2020-06-10 RX ADMIN — Medication 15 MILLIGRAM(S): at 11:56

## 2020-06-10 RX ADMIN — Medication 15 MILLIGRAM(S): at 05:37

## 2020-06-10 RX ADMIN — HEPARIN SODIUM 7500 UNIT(S): 5000 INJECTION INTRAVENOUS; SUBCUTANEOUS at 11:56

## 2020-06-10 RX ADMIN — ALVIMOPAN 12 MILLIGRAM(S): 12 CAPSULE ORAL at 05:37

## 2020-06-10 RX ADMIN — ALVIMOPAN 12 MILLIGRAM(S): 12 CAPSULE ORAL at 18:06

## 2020-06-10 RX ADMIN — Medication 1000 MILLIGRAM(S): at 05:37

## 2020-06-10 RX ADMIN — HYDROMORPHONE HYDROCHLORIDE 0.5 MILLIGRAM(S): 2 INJECTION INTRAMUSCULAR; INTRAVENOUS; SUBCUTANEOUS at 20:06

## 2020-06-10 RX ADMIN — OXYCODONE HYDROCHLORIDE 5 MILLIGRAM(S): 5 TABLET ORAL at 23:03

## 2020-06-10 RX ADMIN — OXYCODONE HYDROCHLORIDE 5 MILLIGRAM(S): 5 TABLET ORAL at 11:07

## 2020-06-10 RX ADMIN — Medication 1000 MILLIGRAM(S): at 23:03

## 2020-06-10 RX ADMIN — Medication 15 MILLIGRAM(S): at 18:07

## 2020-06-10 RX ADMIN — HEPARIN SODIUM 7500 UNIT(S): 5000 INJECTION INTRAVENOUS; SUBCUTANEOUS at 05:37

## 2020-06-10 RX ADMIN — Medication 15 MILLIGRAM(S): at 23:03

## 2020-06-10 RX ADMIN — HYDROMORPHONE HYDROCHLORIDE 0.5 MILLIGRAM(S): 2 INJECTION INTRAMUSCULAR; INTRAVENOUS; SUBCUTANEOUS at 08:06

## 2020-06-10 RX ADMIN — Medication 1000 MILLIGRAM(S): at 11:56

## 2020-06-10 RX ADMIN — PANTOPRAZOLE SODIUM 40 MILLIGRAM(S): 20 TABLET, DELAYED RELEASE ORAL at 08:07

## 2020-06-10 RX ADMIN — HYDROMORPHONE HYDROCHLORIDE 0.5 MILLIGRAM(S): 2 INJECTION INTRAMUSCULAR; INTRAVENOUS; SUBCUTANEOUS at 03:35

## 2020-06-10 RX ADMIN — Medication 1000 MILLIGRAM(S): at 18:06

## 2020-06-10 RX ADMIN — HYDROMORPHONE HYDROCHLORIDE 0.5 MILLIGRAM(S): 2 INJECTION INTRAMUSCULAR; INTRAVENOUS; SUBCUTANEOUS at 16:02

## 2020-06-10 RX ADMIN — HEPARIN SODIUM 7500 UNIT(S): 5000 INJECTION INTRAVENOUS; SUBCUTANEOUS at 23:03

## 2020-06-10 NOTE — PROGRESS NOTE ADULT - ASSESSMENT
Assessment: 34M PMH of Crohn's disease recently on Stelara, now s/p sigmoidectomy, small bowel resection, primary bladder repair, ileostomy creation 6/9/2020.    Recommendations:  - DO NOT REMOVE Oconnor catheter before 6/14/2020  - Send NIDA drains for Cr prior to removal  - Continue care per primary team  - No urologic surgery intervention at this time

## 2020-06-10 NOTE — PROGRESS NOTE ADULT - SUBJECTIVE AND OBJECTIVE BOX
SUBJECTIVE: Resting comfortably in bed without complaints        OBJECTIVE:    Vital Signs:  Vital Signs Last 24 Hrs  T(C): 36.8 (10 Leonel 2020 05:07), Max: 37.6 (09 Jun 2020 12:43)  T(F): 98.2 (10 Elonel 2020 05:07), Max: 99.6 (09 Jun 2020 12:43)  HR: 88 (10 Leonel 2020 05:07) (74 - 97)  BP: 107/69 (10 Leonel 2020 05:07) (99/43 - 118/76)  BP(mean): 74 (09 Jun 2020 13:53) (62 - 79)  RR: 17 (10 Leonel 2020 05:07) (15 - 27)  SpO2: 94% (10 Leonel 2020 05:07) (94% - 100%)    Physical Exam:  General: NAD  Pulmonary: Nonlabored breathing, no respiratory distress  Abdominal: Soft, slightly distended, tender to deep palpation in all quadrants, no suprapubic tenderness, midline dressing CDI, NIDA drains with serosanguinous output, ostomy pink with gas in appliance  : Oconnor draining clear yellow urine    Ins and Outs:  I&O's Summary    09 Jun 2020 07:01  -  10 Leonel 2020 07:00  --------------------------------------------------------  IN: 780 mL / OUT: 1622.5 mL / NET: -842.5 mL    10 Leonel 2020 07:01  -  10 Leonel 2020 11:06  --------------------------------------------------------  IN: 0 mL / OUT: 25 mL / NET: -25 mL        Labs:                        13.4   11.67 )-----------( 239      ( 10 Leonel 2020 05:49 )             42.9     06-10    138  |  102  |  14  ----------------------------<  128<H>  4.1   |  24  |  0.86    Ca    8.5      10 Leonel 2020 05:49  Phos  3.8     06-10  Mg     2.6     06-10          CAPILLARY BLOOD GLUCOSE      POCT Blood Glucose.: 137 mg/dL (09 Jun 2020 14:09)

## 2020-06-10 NOTE — ADVANCED PRACTICE NURSE CONSULT - ASSESSMENT
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 Presented on 6/9 for elective colon resection & creation of ileostomy. Now s/p ex lap, ileocecal & sigmoid resection, creation of ileocolic anastomosis & diverting loop ileostomy, as well as primary cystorraphy done by Urology, POD 1. Appliance not removed but gas noted in pouch, small amount of stoma sweat. 1 3/4" Yenni 2 piece appliance intact with dry dressing at midline, NIDA drain. All secured with abdominal binder. Using stoma model, began educating pt on frequency of emptying and changing appliance. Some education provided to pt prior to procedure in surgeon's office. Extra supplies at bedside for discharge.

## 2020-06-10 NOTE — PROGRESS NOTE ADULT - ASSESSMENT
34 year old male with PMH of Crohn's disease recently on Stelara, now s/p sigmoidectomy, small bowel resection, primary bladder repair, ileostomy creation 6/9 POD     Plan:   - CLD/IVF  - Pain/Nausea Control   - Oconnor for 5 days   - NIDA x 2: Pelvis, Under Skin   - DVT PPX/SCD/IS   - Ostomy Consult   - AM Labs

## 2020-06-10 NOTE — PROGRESS NOTE ADULT - SUBJECTIVE AND OBJECTIVE BOX
STATUS POST:  6/9: sigmoid colon resection, small bowel resection, primary bladder repair, ileostomy creation     SUBJECTIVE: Patient seen and examined bedside by chief resident.  pt complains of pain when coughing. hasnt gotten out of bed yet. tolerating CLD. denies nausea and vomiting     heparin   Injectable 7500 Unit(s) SubCutaneous every 8 hours      Vital Signs Last 24 Hrs  T(C): 36.8 (10 Leonel 2020 05:07), Max: 37.6 (09 Jun 2020 12:43)  T(F): 98.2 (10 Leonel 2020 05:07), Max: 99.6 (09 Jun 2020 12:43)  HR: 88 (10 Leonel 2020 05:07) (74 - 97)  BP: 107/69 (10 Leonel 2020 05:07) (99/43 - 118/76)  BP(mean): 74 (09 Jun 2020 13:53) (62 - 79)  RR: 17 (10 Leonel 2020 05:07) (15 - 27)  SpO2: 94% (10 Leonel 2020 05:07) (94% - 100%)  I&O's Detail    09 Jun 2020 07:01  -  10 Leonel 2020 07:00  --------------------------------------------------------  IN:    lactated ringers.: 680 mL    Oral Fluid: 100 mL  Total IN: 780 mL    OUT:    Bulb: 22.5 mL    Bulb: 260 mL    Indwelling Catheter - Urethral: 1300 mL  Total OUT: 1582.5 mL    Total NET: -802.5 mL          General: NAD, resting comfortably in bed  C/V: NSR  Pulm: Nonlabored breathing, no respiratory distress  Abd: obese, soft, mild distended, ileostomy with some bowel sweat, incisions are clean, dry and intact, NIDA x 2 with serosang fluid  Extrem: WWP, no edema, SCDs in place        LABS:                        13.4   11.67 )-----------( 239      ( 10 Leonel 2020 05:49 )             42.9     06-10    138  |  102  |  14  ----------------------------<  128<H>  4.1   |  24  |  0.86    Ca    8.5      10 Leonel 2020 05:49  Phos  3.8     06-10  Mg     2.6     06-10            RADIOLOGY & ADDITIONAL STUDIES:

## 2020-06-11 ENCOUNTER — TRANSCRIPTION ENCOUNTER (OUTPATIENT)
Age: 35
End: 2020-06-11

## 2020-06-11 LAB
ANION GAP SERPL CALC-SCNC: 10 MMOL/L — SIGNIFICANT CHANGE UP (ref 5–17)
BUN SERPL-MCNC: 15 MG/DL — SIGNIFICANT CHANGE UP (ref 7–23)
CALCIUM SERPL-MCNC: 8.9 MG/DL — SIGNIFICANT CHANGE UP (ref 8.4–10.5)
CHLORIDE SERPL-SCNC: 101 MMOL/L — SIGNIFICANT CHANGE UP (ref 96–108)
CO2 SERPL-SCNC: 25 MMOL/L — SIGNIFICANT CHANGE UP (ref 22–31)
CREAT SERPL-MCNC: 0.84 MG/DL — SIGNIFICANT CHANGE UP (ref 0.5–1.3)
GLUCOSE SERPL-MCNC: 116 MG/DL — HIGH (ref 70–99)
HCT VFR BLD CALC: 43.7 % — SIGNIFICANT CHANGE UP (ref 39–50)
HGB BLD-MCNC: 13.5 G/DL — SIGNIFICANT CHANGE UP (ref 13–17)
MAGNESIUM SERPL-MCNC: 2.1 MG/DL — SIGNIFICANT CHANGE UP (ref 1.6–2.6)
MCHC RBC-ENTMCNC: 24.3 PG — LOW (ref 27–34)
MCHC RBC-ENTMCNC: 30.9 GM/DL — LOW (ref 32–36)
MCV RBC AUTO: 78.6 FL — LOW (ref 80–100)
NRBC # BLD: 0 /100 WBCS — SIGNIFICANT CHANGE UP (ref 0–0)
PHOSPHATE SERPL-MCNC: 2.7 MG/DL — SIGNIFICANT CHANGE UP (ref 2.5–4.5)
PLATELET # BLD AUTO: 232 K/UL — SIGNIFICANT CHANGE UP (ref 150–400)
POTASSIUM SERPL-MCNC: 4.5 MMOL/L — SIGNIFICANT CHANGE UP (ref 3.5–5.3)
POTASSIUM SERPL-SCNC: 4.5 MMOL/L — SIGNIFICANT CHANGE UP (ref 3.5–5.3)
RBC # BLD: 5.56 M/UL — SIGNIFICANT CHANGE UP (ref 4.2–5.8)
RBC # FLD: 15.3 % — HIGH (ref 10.3–14.5)
SODIUM SERPL-SCNC: 136 MMOL/L — SIGNIFICANT CHANGE UP (ref 135–145)
WBC # BLD: 11.86 K/UL — HIGH (ref 3.8–10.5)
WBC # FLD AUTO: 11.86 K/UL — HIGH (ref 3.8–10.5)

## 2020-06-11 PROCEDURE — 99223 1ST HOSP IP/OBS HIGH 75: CPT | Mod: GC

## 2020-06-11 RX ORDER — OXYCODONE HYDROCHLORIDE 5 MG/1
5 TABLET ORAL EVERY 4 HOURS
Refills: 0 | Status: DISCONTINUED | OUTPATIENT
Start: 2020-06-11 | End: 2020-06-16

## 2020-06-11 RX ORDER — OXYCODONE HYDROCHLORIDE 5 MG/1
10 TABLET ORAL EVERY 4 HOURS
Refills: 0 | Status: DISCONTINUED | OUTPATIENT
Start: 2020-06-11 | End: 2020-06-16

## 2020-06-11 RX ADMIN — HYDROMORPHONE HYDROCHLORIDE 0.5 MILLIGRAM(S): 2 INJECTION INTRAMUSCULAR; INTRAVENOUS; SUBCUTANEOUS at 06:55

## 2020-06-11 RX ADMIN — Medication 1000 MILLIGRAM(S): at 17:11

## 2020-06-11 RX ADMIN — OXYCODONE HYDROCHLORIDE 5 MILLIGRAM(S): 5 TABLET ORAL at 10:14

## 2020-06-11 RX ADMIN — Medication 15 MILLIGRAM(S): at 23:26

## 2020-06-11 RX ADMIN — Medication 15 MILLIGRAM(S): at 11:27

## 2020-06-11 RX ADMIN — ALVIMOPAN 12 MILLIGRAM(S): 12 CAPSULE ORAL at 17:11

## 2020-06-11 RX ADMIN — Medication 1000 MILLIGRAM(S): at 11:27

## 2020-06-11 RX ADMIN — OXYCODONE HYDROCHLORIDE 10 MILLIGRAM(S): 5 TABLET ORAL at 15:35

## 2020-06-11 RX ADMIN — HEPARIN SODIUM 7500 UNIT(S): 5000 INJECTION INTRAVENOUS; SUBCUTANEOUS at 21:32

## 2020-06-11 RX ADMIN — HYDROMORPHONE HYDROCHLORIDE 0.5 MILLIGRAM(S): 2 INJECTION INTRAMUSCULAR; INTRAVENOUS; SUBCUTANEOUS at 01:23

## 2020-06-11 RX ADMIN — HEPARIN SODIUM 7500 UNIT(S): 5000 INJECTION INTRAVENOUS; SUBCUTANEOUS at 05:22

## 2020-06-11 RX ADMIN — Medication 15 MILLIGRAM(S): at 05:22

## 2020-06-11 RX ADMIN — HEPARIN SODIUM 7500 UNIT(S): 5000 INJECTION INTRAVENOUS; SUBCUTANEOUS at 13:09

## 2020-06-11 RX ADMIN — Medication 1000 MILLIGRAM(S): at 23:26

## 2020-06-11 RX ADMIN — OXYCODONE HYDROCHLORIDE 5 MILLIGRAM(S): 5 TABLET ORAL at 22:20

## 2020-06-11 RX ADMIN — Medication 1000 MILLIGRAM(S): at 05:23

## 2020-06-11 RX ADMIN — Medication 15 MILLIGRAM(S): at 17:11

## 2020-06-11 RX ADMIN — SODIUM CHLORIDE 40 MILLILITER(S): 9 INJECTION, SOLUTION INTRAVENOUS at 23:26

## 2020-06-11 RX ADMIN — ALVIMOPAN 12 MILLIGRAM(S): 12 CAPSULE ORAL at 05:23

## 2020-06-11 NOTE — PROGRESS NOTE ADULT - SUBJECTIVE AND OBJECTIVE BOX
STATUS POST:  6/9: sigmoid colon resection, small bowel resection, primary bladder repair, ileostomy creation     SUBJECTIVE: Patient seen and examined bedside by chief resident. patient complains of pain and would like more pain medication available to him. tolerating CLD without nausea or vomiting     heparin   Injectable 7500 Unit(s) SubCutaneous every 8 hours      Vital Signs Last 24 Hrs  T(C): 36.9 (11 Jun 2020 05:34), Max: 37.3 (10 Leonel 2020 21:34)  T(F): 98.4 (11 Jun 2020 05:34), Max: 99.1 (10 Leonel 2020 21:34)  HR: 90 (11 Jun 2020 05:34) (86 - 96)  BP: 134/85 (11 Jun 2020 05:34) (116/74 - 134/85)  BP(mean): --  RR: 18 (11 Jun 2020 05:34) (16 - 95)  SpO2: 93% (11 Jun 2020 05:34) (93% - 94%)  I&O's Detail    10 Leonel 2020 07:01  -  11 Jun 2020 07:00  --------------------------------------------------------  IN:    lactated ringers.: 920 mL    Oral Fluid: 200 mL  Total IN: 1120 mL    OUT:    Bulb: 140 mL    Bulb: 80 mL    Ileostomy: 155 mL    Indwelling Catheter - Urethral: 825 mL  Total OUT: 1200 mL    Total NET: -80 mL          General: NAD, resting comfortably in bed  C/V: NSR  Pulm: Nonlabored breathing, no respiratory distress  Abd: soft, mild distended, NIDA x 2 with serosang fluid, stoma with bowel sweat, all other dressings c/d/i, abdominal binder in place  Extrem: WWP, no edema, SCDs in place        LABS:                        13.4   11.67 )-----------( 239      ( 10 Leonel 2020 05:49 )             42.9     06-10    138  |  102  |  14  ----------------------------<  128<H>  4.1   |  24  |  0.86    Ca    8.5      10 Leonel 2020 05:49  Phos  3.8     06-10  Mg     2.6     06-10            RADIOLOGY & ADDITIONAL STUDIES:

## 2020-06-11 NOTE — PROGRESS NOTE ADULT - ASSESSMENT
34 year old male with PMH of Crohn's disease recently on Stelara, now s/p sigmoidectomy, small bowel resection, primary bladder repair, ileostomy creation 6/9 POD     - CLD/IVF  - Pain/Nausea Control   - Oconnor for 5 days   - NIDA x 2: Pelvis, Under Skin   - DVT PPX/SCD/IS   - Ostomy Consult   - AM Labs

## 2020-06-11 NOTE — ADVANCED PRACTICE NURSE CONSULT - ASSESSMENT
New 1 3/4" Speed 2 piece appliance placed with pt observing. Stoma pink and budded, measures 1 1/2". Approx 20 cc's of dark brown liquid effluent in old appliance, dressing dry and intact to midline incision. Explained each step to pt as appliance was being changed, reviewed frequency of emptying and changing appliance. Also reviewed how to order supplies after discharge and diet. Written information on diet given to pt to read. Pt with minimal interaction during visit but appears to understand process when prompted.

## 2020-06-11 NOTE — CONSULT NOTE ADULT - SUBJECTIVE AND OBJECTIVE BOX
HPI:  34M w/ PMHx of stricturing/fistulizing ileocolonic Crohn's Disease previously on Stelara and psoriasis presents for elective colon resection.  Patient was found to have continue disease on therapy with prior endoscopy notable for stricture disease and CT notable for complex fistulas involving the distal TI, sigmoid colon and a enterovesical fistula.  Patient went for elective surgical intervention on 6/9/20 with ileocolonic resection, sigmoid resection, diverting loop ileostomy, and cystorraphy.  Patient continues to have pain but otherwise tolerating clears without any issue. No other complaints. Patient has been off Stelara since prior UTI.     EGD 12/18: Gastritis (biopsy w/ mild reactive gastropathy) and duodenitis (biopsy with preserved villious architecture  Colonoscopy 12/18: Healed mucosa in most of the examined colon, fistula in sigmoid colon, inflammatory pseudopolyp, stricture of ascending colon vs cecal inflammatory polyps, unable to traverse this area.  CT enterography on 12/23/19 showed multiple complex fistulas involving the sigmoid colon and distal terminal ileum as well as an enterovesicular fistula.     Allergies    No Known Allergies    Intolerances      Home Medications:    MEDICATIONS:  MEDICATIONS  (STANDING):  acetaminophen   Tablet .. 1000 milliGRAM(s) Oral every 6 hours  alvimopan 12 milliGRAM(s) Oral two times a day  BUpivacaine liposome 1.3% Injectable (no eMAR) 20 milliLiter(s) Local Injection once  heparin   Injectable 7500 Unit(s) SubCutaneous every 8 hours  ketorolac   Injectable 15 milliGRAM(s) IV Push every 6 hours  lactated ringers. 1000 milliLiter(s) (40 mL/Hr) IV Continuous <Continuous>    MEDICATIONS  (PRN):  HYDROmorphone  Injectable 0.5 milliGRAM(s) IV Push every 4 hours PRN Breakthrough Pain  ondansetron Injectable 4 milliGRAM(s) IV Push every 6 hours PRN Nausea and/or Vomiting  oxyCODONE    IR 5 milliGRAM(s) Oral every 4 hours PRN Moderate Pain (4 - 6)  oxyCODONE    IR 10 milliGRAM(s) Oral every 4 hours PRN Severe Pain (7 - 10)  pantoprazole    Tablet 40 milliGRAM(s) Oral before breakfast PRN Heartburn    PAST MEDICAL & SURGICAL HISTORY:  Crohn's disease    FAMILY HISTORY:  Brother with IBD    SOCIAL HISTORY:  Tobacco: denies  Alcohol: denies  Illicit Drugs:denies    REVIEW OF SYSTEMS:  All other 10 review of systems is negative except as indicated in HPI    Vital Signs Last 24 Hrs  T(C): 36.8 (11 Jun 2020 13:04), Max: 37.3 (10 Leonel 2020 21:34)  T(F): 98.2 (11 Jun 2020 13:04), Max: 99.1 (10 Leonel 2020 21:34)  HR: 97 (11 Jun 2020 13:04) (67 - 97)  BP: 136/95 (11 Jun 2020 13:04) (97/63 - 136/95)  BP(mean): --  RR: 18 (11 Jun 2020 13:04) (16 - 18)  SpO2: 94% (11 Jun 2020 13:04) (93% - 97%)    06-10 @ 07:01  -  06-11 @ 07:00  --------------------------------------------------------  IN: 1120 mL / OUT: 1200 mL / NET: -80 mL    06-11 @ 07:01  - 06-11 @ 15:51  --------------------------------------------------------  IN: 1120 mL / OUT: 190 mL / NET: 930 mL        PHYSICAL EXAM:    General: Well developed; well nourished; in no acute distress  Eyes: moist conjunctivae, sclerae anicteric  HENT: Moist mucous membranes, tongue midline  Neck: Trachea midline, supple  Lungs: Normal respiratory effort and no intercostal retractions  Cardiovascular: RRR, S1S2  Abdomen: Soft, abdominal binder in place, tender to palpation, LLQ ileostomy with intact stoma nontender to palpation, 2 NIDA drain draining serosanguinous fluid  Extremities: Normal range of motion, No clubbing, cyanosis or edema  Neurological: Alert and oriented x3, nonfocal exam  Skin: Warm and dry. No obvious rash    LABS:                        13.5   11.86 )-----------( 232      ( 11 Jun 2020 07:32 )             43.7     06-11    136  |  101  |  15  ----------------------------<  116<H>  4.5   |  25  |  0.84    Ca    8.9      11 Jun 2020 07:32  Phos  2.7     06-11  Mg     2.1     06-11              RADIOLOGY & ADDITIONAL STUDIES:   reviewed HPI:  35M w/ PMHx of stricturing/fistulizing ileocolonic Crohn's Disease previously on Stelara and psoriasis presents for elective colon resection.  Patient was found to have continue disease on therapy with prior endoscopy notable for stricture disease and CT notable for complex fistulas involving the distal TI, sigmoid colon and a enterovesical fistula.  Patient went for elective surgical intervention on 6/9/20 with ileocolonic resection, sigmoid resection, diverting loop ileostomy, and cystorraphy.  Patient continues to have pain but otherwise tolerating clears without any issue. No other complaints. Patient has been off Stelara since prior UTI.     EGD 12/18: Gastritis (biopsy w/ mild reactive gastropathy) and duodenitis (biopsy with preserved villious architecture  Colonoscopy 12/18: Healed mucosa in most of the examined colon, fistula in sigmoid colon, inflammatory pseudopolyp, stricture of ascending colon vs cecal inflammatory polyps, unable to traverse this area.  CT enterography on 12/23/19 showed multiple complex fistulas involving the sigmoid colon and distal terminal ileum as well as an enterovesicular fistula.     Allergies    No Known Allergies    Intolerances      Home Medications:    MEDICATIONS:  MEDICATIONS  (STANDING):  acetaminophen   Tablet .. 1000 milliGRAM(s) Oral every 6 hours  alvimopan 12 milliGRAM(s) Oral two times a day  BUpivacaine liposome 1.3% Injectable (no eMAR) 20 milliLiter(s) Local Injection once  heparin   Injectable 7500 Unit(s) SubCutaneous every 8 hours  ketorolac   Injectable 15 milliGRAM(s) IV Push every 6 hours  lactated ringers. 1000 milliLiter(s) (40 mL/Hr) IV Continuous <Continuous>    MEDICATIONS  (PRN):  HYDROmorphone  Injectable 0.5 milliGRAM(s) IV Push every 4 hours PRN Breakthrough Pain  ondansetron Injectable 4 milliGRAM(s) IV Push every 6 hours PRN Nausea and/or Vomiting  oxyCODONE    IR 5 milliGRAM(s) Oral every 4 hours PRN Moderate Pain (4 - 6)  oxyCODONE    IR 10 milliGRAM(s) Oral every 4 hours PRN Severe Pain (7 - 10)  pantoprazole    Tablet 40 milliGRAM(s) Oral before breakfast PRN Heartburn    PAST MEDICAL & SURGICAL HISTORY:  Crohn's disease    FAMILY HISTORY:  Brother with IBD    SOCIAL HISTORY:  Tobacco: denies  Alcohol: denies  Illicit Drugs:denies    REVIEW OF SYSTEMS:  All other 10 review of systems is negative except as indicated in HPI    Vital Signs Last 24 Hrs  T(C): 36.8 (11 Jun 2020 13:04), Max: 37.3 (10 Leonel 2020 21:34)  T(F): 98.2 (11 Jun 2020 13:04), Max: 99.1 (10 Leonel 2020 21:34)  HR: 97 (11 Jun 2020 13:04) (67 - 97)  BP: 136/95 (11 Jun 2020 13:04) (97/63 - 136/95)  BP(mean): --  RR: 18 (11 Jun 2020 13:04) (16 - 18)  SpO2: 94% (11 Jun 2020 13:04) (93% - 97%)    06-10 @ 07:01  -  06-11 @ 07:00  --------------------------------------------------------  IN: 1120 mL / OUT: 1200 mL / NET: -80 mL    06-11 @ 07:01  - 06-11 @ 15:51  --------------------------------------------------------  IN: 1120 mL / OUT: 190 mL / NET: 930 mL        PHYSICAL EXAM:    General: Well developed; well nourished; in no acute distress  Eyes: moist conjunctivae, sclerae anicteric  HENT: Moist mucous membranes, tongue midline  Neck: Trachea midline, supple  Lungs: Normal respiratory effort and no intercostal retractions  Cardiovascular: RRR, S1S2  Abdomen: Soft, abdominal binder in place, tender to palpation, LLQ ileostomy with intact stoma nontender to palpation, 2 NIDA drain draining serosanguinous fluid  Extremities: Normal range of motion, No clubbing, cyanosis or edema  Neurological: Alert and oriented x3, nonfocal exam  Skin: Warm and dry. No obvious rash    LABS:                        13.5   11.86 )-----------( 232      ( 11 Jun 2020 07:32 )             43.7     06-11    136  |  101  |  15  ----------------------------<  116<H>  4.5   |  25  |  0.84    Ca    8.9      11 Jun 2020 07:32  Phos  2.7     06-11  Mg     2.1     06-11              RADIOLOGY & ADDITIONAL STUDIES:   reviewed

## 2020-06-11 NOTE — DISCHARGE NOTE NURSING/CASE MANAGEMENT/SOCIAL WORK - PATIENT PORTAL LINK FT
You can access the FollowMyHealth Patient Portal offered by [facility] by registering at the following website: [email or web address]. By joining [employer]’s FollowMyHealth portal, you will also be able to view your health information using other applications (apps) compatible with our system.

## 2020-06-11 NOTE — CONSULT NOTE ADULT - ASSESSMENT
34M w/ PMHx of stricturing/fistulizing ileocolonic Crohn's Disease previously on Stelara and psoriasis presents for elective colon resection now s/p ileocolonic resection, sigmoid resection, diverting loop ileostomy, and cystorraphy 6/9/20.    #Crohn's Disease  Patient with stricturing/fistulizing ileocolonic now s/p resection and diverting loop ileostomy.  Previous on Stelara with prior use of remicade and ADA.  Will evaluate therapy at outpatient visit.   -Follow out-patient in 4 weeks    Case d/w Dr. Espinoza 35M w/ PMHx of stricturing/fistulizing ileocolonic Crohn's Disease previously on Stelara and psoriasis presents for elective colon resection now s/p ileocolonic resection, sigmoid resection, diverting loop ileostomy, and cystorraphy 6/9/20.    #Crohn's Disease  Patient with stricturing/fistulizing ileocolonic now s/p resection and diverting loop ileostomy.  Previous on Stelara with prior use of remicade and ADA.  Will evaluate therapy at outpatient visit.   -Follow out-patient in 4 weeks    Case d/w Dr. Espinoza

## 2020-06-12 LAB
ANION GAP SERPL CALC-SCNC: 14 MMOL/L — SIGNIFICANT CHANGE UP (ref 5–17)
BUN SERPL-MCNC: 17 MG/DL — SIGNIFICANT CHANGE UP (ref 7–23)
CALCIUM SERPL-MCNC: 8.9 MG/DL — SIGNIFICANT CHANGE UP (ref 8.4–10.5)
CHLORIDE SERPL-SCNC: 101 MMOL/L — SIGNIFICANT CHANGE UP (ref 96–108)
CO2 SERPL-SCNC: 24 MMOL/L — SIGNIFICANT CHANGE UP (ref 22–31)
CREAT SERPL-MCNC: 0.83 MG/DL — SIGNIFICANT CHANGE UP (ref 0.5–1.3)
GLUCOSE SERPL-MCNC: 119 MG/DL — HIGH (ref 70–99)
HCT VFR BLD CALC: 42.4 % — SIGNIFICANT CHANGE UP (ref 39–50)
HGB BLD-MCNC: 13.5 G/DL — SIGNIFICANT CHANGE UP (ref 13–17)
MAGNESIUM SERPL-MCNC: 2.2 MG/DL — SIGNIFICANT CHANGE UP (ref 1.6–2.6)
MCHC RBC-ENTMCNC: 24.7 PG — LOW (ref 27–34)
MCHC RBC-ENTMCNC: 31.8 GM/DL — LOW (ref 32–36)
MCV RBC AUTO: 77.7 FL — LOW (ref 80–100)
NRBC # BLD: 0 /100 WBCS — SIGNIFICANT CHANGE UP (ref 0–0)
PHOSPHATE SERPL-MCNC: 3.9 MG/DL — SIGNIFICANT CHANGE UP (ref 2.5–4.5)
PLATELET # BLD AUTO: 264 K/UL — SIGNIFICANT CHANGE UP (ref 150–400)
POTASSIUM SERPL-MCNC: 4.1 MMOL/L — SIGNIFICANT CHANGE UP (ref 3.5–5.3)
POTASSIUM SERPL-SCNC: 4.1 MMOL/L — SIGNIFICANT CHANGE UP (ref 3.5–5.3)
RBC # BLD: 5.46 M/UL — SIGNIFICANT CHANGE UP (ref 4.2–5.8)
RBC # FLD: 15.4 % — HIGH (ref 10.3–14.5)
SODIUM SERPL-SCNC: 139 MMOL/L — SIGNIFICANT CHANGE UP (ref 135–145)
SURGICAL PATHOLOGY STUDY: SIGNIFICANT CHANGE UP
WBC # BLD: 10.9 K/UL — HIGH (ref 3.8–10.5)
WBC # FLD AUTO: 10.9 K/UL — HIGH (ref 3.8–10.5)

## 2020-06-12 PROCEDURE — 74018 RADEX ABDOMEN 1 VIEW: CPT | Mod: 26

## 2020-06-12 RX ORDER — SODIUM CHLORIDE 9 MG/ML
1000 INJECTION, SOLUTION INTRAVENOUS
Refills: 0 | Status: DISCONTINUED | OUTPATIENT
Start: 2020-06-12 | End: 2020-06-13

## 2020-06-12 RX ORDER — SODIUM CHLORIDE 9 MG/ML
500 INJECTION, SOLUTION INTRAVENOUS ONCE
Refills: 0 | Status: COMPLETED | OUTPATIENT
Start: 2020-06-12 | End: 2020-06-12

## 2020-06-12 RX ADMIN — ALVIMOPAN 12 MILLIGRAM(S): 12 CAPSULE ORAL at 17:22

## 2020-06-12 RX ADMIN — Medication 1000 MILLIGRAM(S): at 12:14

## 2020-06-12 RX ADMIN — HEPARIN SODIUM 7500 UNIT(S): 5000 INJECTION INTRAVENOUS; SUBCUTANEOUS at 14:41

## 2020-06-12 RX ADMIN — Medication 1000 MILLIGRAM(S): at 17:22

## 2020-06-12 RX ADMIN — Medication 15 MILLIGRAM(S): at 06:02

## 2020-06-12 RX ADMIN — OXYCODONE HYDROCHLORIDE 5 MILLIGRAM(S): 5 TABLET ORAL at 16:01

## 2020-06-12 RX ADMIN — ALVIMOPAN 12 MILLIGRAM(S): 12 CAPSULE ORAL at 06:01

## 2020-06-12 RX ADMIN — Medication 1000 MILLIGRAM(S): at 23:12

## 2020-06-12 RX ADMIN — Medication 1000 MILLIGRAM(S): at 06:01

## 2020-06-12 RX ADMIN — Medication 15 MILLIGRAM(S): at 12:14

## 2020-06-12 RX ADMIN — HEPARIN SODIUM 7500 UNIT(S): 5000 INJECTION INTRAVENOUS; SUBCUTANEOUS at 21:49

## 2020-06-12 RX ADMIN — SODIUM CHLORIDE 1000 MILLILITER(S): 9 INJECTION, SOLUTION INTRAVENOUS at 08:56

## 2020-06-12 RX ADMIN — SODIUM CHLORIDE 100 MILLILITER(S): 9 INJECTION, SOLUTION INTRAVENOUS at 21:49

## 2020-06-12 RX ADMIN — HEPARIN SODIUM 7500 UNIT(S): 5000 INJECTION INTRAVENOUS; SUBCUTANEOUS at 06:02

## 2020-06-12 NOTE — CHART NOTE - NSCHARTNOTEFT_GEN_A_CORE
Upon Nutritional Assessment by the Registered Dietitian your patient was determined to meet criteria / has evidence of the following diagnosis/diagnoses:          [ ]  Mild Protein Calorie Malnutrition        [ ]  Moderate Protein Calorie Malnutrition        [ ] Severe Protein Calorie Malnutrition        [ ] Unspecified Protein Calorie Malnutrition        [ ] Underweight / BMI <19        [ x ] Morbid Obesity / BMI > 40 (BMI 50.2 kg/m2)    Findings as based on:  •  Comprehensive nutrition assessment and consultation  •  Calorie counts (nutrient intake analysis)  •  Food acceptance and intake status from observations by staff  •  Follow up  •  Patient education  •  Intervention secondary to interdisciplinary rounds  •   concerns    Treatment:    The following diet has been recommended:  1. Clear liquid kosher diet  2. Recommend advance to low fiber kosher diet when medically feasible  3. Cont with pain and bowel regimes per team  4. Cont to monitor new ostomy site per team  5. Appreciate teams continued support of adequate PO intake  6. RD diet education reenforcement prn    PROVIDER Section:     By signing this assessment you are acknowledging and agree with the diagnosis/diagnoses assigned by the Registered Dietitian    Comments:

## 2020-06-12 NOTE — PROGRESS NOTE ADULT - ASSESSMENT
34 year old male with PMH of Crohn's disease recently on Stelara, now s/p sigmoidectomy, small bowel resection, primary bladder repair, ileostomy creation 6/9 POD     - CLD/IVF  - Pain/Nausea Control   - Oconnor for 5 days   - NIDA x 2: Pelvis, Under Skin   - DVT PPX/SCD/IS   - Ostomy Consult   - AM Labs   - KUB

## 2020-06-12 NOTE — CHART NOTE - NSCHARTNOTEFT_GEN_A_CORE
Admitting Diagnosis:   Patient is a 35y old  Male who presents with a chief complaint of colon resection (12 Jun 2020 07:56)    Consult: Yes [   ]  No [ x  ]    Reason for Initial Nutrition Assessment: LOS assessment    PAST MEDICAL & SURGICAL HISTORY:  Crohn's disease    Current Nutrition Order: CLD/ Kosher diet    PO Intake: Good (%) [   ]  Fair (50-75%) [   ] Poor (<25%) [   ]- Pt was lethargic this am and did not eat breakfast at time of assessment    GI Issues:   WDL, Ileostomy (370 ml/24hrs)  No n/d/v/c noted  Some abd distention/ bloating noted    Pain:  No pain noted at this time    Skin Integrity:  Surgical incision, nakia score 20    Labs:   06-12    139  |  101  |  17  ----------------------------<  119<H>  4.1   |  24  |  0.83    Ca    8.9      12 Jun 2020 07:29  Phos  3.9     06-12  Mg     2.2     06-12    CAPILLARY BLOOD GLUCOSE    Medications:  MEDICATIONS  (STANDING):  acetaminophen   Tablet .. 1000 milliGRAM(s) Oral every 6 hours  alvimopan 12 milliGRAM(s) Oral two times a day  BUpivacaine liposome 1.3% Injectable (no eMAR) 20 milliLiter(s) Local Injection once  heparin   Injectable 7500 Unit(s) SubCutaneous every 8 hours  lactated ringers. 1000 milliLiter(s) (100 mL/Hr) IV Continuous <Continuous>    MEDICATIONS  (PRN):  HYDROmorphone  Injectable 0.5 milliGRAM(s) IV Push every 4 hours PRN Breakthrough Pain  ondansetron Injectable 4 milliGRAM(s) IV Push every 6 hours PRN Nausea and/or Vomiting  oxyCODONE    IR 5 milliGRAM(s) Oral every 4 hours PRN Moderate Pain (4 - 6)  oxyCODONE    IR 10 milliGRAM(s) Oral every 4 hours PRN Severe Pain (7 - 10)  pantoprazole    Tablet 40 milliGRAM(s) Oral before breakfast PRN Heartburn    Admitted Anthropometrics:  Height: 65" Weight: 301lbs, IBW 136lbs+/-10%, %%, BMI 50.2 kg/m2     Weight:  6/9 301lbs    Weight Change:  No new weights obtained during this admission. Please cont to reweigh when medically feasible     Nutrition Focused Physical Exam: Completed [   ]  Unable to complete [   ]- unremarkable    Estimated energy needs:   IBW (62kg) used for calculations as pt >120% of IBW. Please use upper end for wound healing.   Kcal (25-30 kcal/kg): 7316-6346 kcal   Protein (1.0-1.2 g/kg pro): 62-74 g pro  Fluids (25-30 ml/kg): 2243-2074 ml    Subjective:   35M with PMH of Crohn's disease recently on Stelara, now s/p sigmoidectomy, small bowel resection, primary ladder repair, ileostomy creation 6/9 POD       Nutrition Diagnosis:    [  ] No active nutrition diagnosis at this time  [  ] Current medical condition precludes nutrition intervention    Goal:    Recommendations:    Education:     Risk Level: High [   ] Moderate [   ] Low [   ] Admitting Diagnosis:   Patient is a 35y old  Male who presents with a chief complaint of colon resection (12 Jun 2020 07:56)    Consult: Yes [   ]  No [ x  ]    Reason for Initial Nutrition Assessment: LOS assessment    PAST MEDICAL & SURGICAL HISTORY:  Crohn's disease    Current Nutrition Order: CLD/ Kosher diet    PO Intake: Good (%) [   ]  Fair (50-75%) [   ] Poor (<25%) [   ]- Pt was lethargic this am and did not eat breakfast at time of assessment    GI Issues:   WDL, Ileostomy (370 ml/24hrs)  No n/d/v/c noted  Some abd distention/ bloating noted    Pain:  No pain noted at this time    Skin Integrity:  Surgical incision, nakia score 20    Labs:   06-12    139  |  101  |  17  ----------------------------<  119<H>  4.1   |  24  |  0.83    Ca    8.9      12 Jun 2020 07:29  Phos  3.9     06-12  Mg     2.2     06-12    CAPILLARY BLOOD GLUCOSE    Medications:  MEDICATIONS  (STANDING):  acetaminophen   Tablet .. 1000 milliGRAM(s) Oral every 6 hours  alvimopan 12 milliGRAM(s) Oral two times a day  BUpivacaine liposome 1.3% Injectable (no eMAR) 20 milliLiter(s) Local Injection once  heparin   Injectable 7500 Unit(s) SubCutaneous every 8 hours  lactated ringers. 1000 milliLiter(s) (100 mL/Hr) IV Continuous <Continuous>    MEDICATIONS  (PRN):  HYDROmorphone  Injectable 0.5 milliGRAM(s) IV Push every 4 hours PRN Breakthrough Pain  ondansetron Injectable 4 milliGRAM(s) IV Push every 6 hours PRN Nausea and/or Vomiting  oxyCODONE    IR 5 milliGRAM(s) Oral every 4 hours PRN Moderate Pain (4 - 6)  oxyCODONE    IR 10 milliGRAM(s) Oral every 4 hours PRN Severe Pain (7 - 10)  pantoprazole    Tablet 40 milliGRAM(s) Oral before breakfast PRN Heartburn    Admitted Anthropometrics:  Height: 65" Weight: 301lbs, IBW 136lbs+/-10%, %%, BMI 50.2 kg/m2     Weight:  6/9 301lbs    Weight Change:  No new weights obtained during this admission. Please cont to reweigh when medically feasible     Nutrition Focused Physical Exam: Completed [   ]  Unable to complete [   ]- unremarkable    Estimated energy needs:   IBW (62kg) used for calculations as pt >120% of IBW. Please use upper end for wound healing.   Kcal (25-30 kcal/kg): 3220-9652 kcal   Protein (1.0-1.2 g/kg pro): 62-74 g pro  Fluids (25-30 ml/kg): 7999-8545 ml    Subjective:   35M with PMH of Crohn's disease recently on Stelara, now s/p sigmoidectomy, small bowel resection, primary ladder repair, ileostomy creation 6/9. On assessment, pt was resting in bed very lethargic but able to participate in assessment. Currently on Clear liquid kosher diet. Meal was on tray this am but pt was in and out of sleep and was unable to eat- RD to follow up and attempt to assess PO intake per protocol. Pt denied N/v/d/c/abd pain but noted some abd distention and bloating. Education provided on likely diet advancement process, and low fiber diet guidelines in relation to bowel sx and new Crohn's- pt appears receptive. Educational handout left at bedside. Pt noted good PO intake PTA. UBW consistent with admission weight. NKFA. Please see nutrition recommendations below. RD to follow up per protocol.     Nutrition Diagnosis: Inadequate oral intake RT inability to meet est needs 2/2 bowel rest AEB NPO/ CLD since 6/9    [  ] No active nutrition diagnosis at this time  [  ] Current medical condition precludes nutrition intervention    Goal:   1. diet will advance within 24-48 hrs  2. Consistently meet >75% est needs    Recommendations:  1. Clear liquid kosher diet  2. Recommend advance to low fiber kosher diet when medically feasible  3. Cont with pain and bowel regimes per team  4. Cont to monitor new ostomy site per team  5. Appreciate teams continued support of adequate PO intake  6. RD diet education reenforcement prn    Education: Education provided on likely diet advancement process, and low fiber diet guidelines in relation to bowel sx and new Crohn's- pt appears receptive. Educational handout left at bedside.     Risk Level: High [ x  ] Moderate [   ] Low [   ]

## 2020-06-13 LAB
ANION GAP SERPL CALC-SCNC: 13 MMOL/L — SIGNIFICANT CHANGE UP (ref 5–17)
BUN SERPL-MCNC: 16 MG/DL — SIGNIFICANT CHANGE UP (ref 7–23)
CALCIUM SERPL-MCNC: 9 MG/DL — SIGNIFICANT CHANGE UP (ref 8.4–10.5)
CHLORIDE SERPL-SCNC: 101 MMOL/L — SIGNIFICANT CHANGE UP (ref 96–108)
CO2 SERPL-SCNC: 25 MMOL/L — SIGNIFICANT CHANGE UP (ref 22–31)
CREAT SERPL-MCNC: 0.78 MG/DL — SIGNIFICANT CHANGE UP (ref 0.5–1.3)
GLUCOSE SERPL-MCNC: 94 MG/DL — SIGNIFICANT CHANGE UP (ref 70–99)
MAGNESIUM SERPL-MCNC: 2.2 MG/DL — SIGNIFICANT CHANGE UP (ref 1.6–2.6)
PHOSPHATE SERPL-MCNC: 4.6 MG/DL — HIGH (ref 2.5–4.5)
POTASSIUM SERPL-MCNC: 4.3 MMOL/L — SIGNIFICANT CHANGE UP (ref 3.5–5.3)
POTASSIUM SERPL-SCNC: 4.3 MMOL/L — SIGNIFICANT CHANGE UP (ref 3.5–5.3)
SODIUM SERPL-SCNC: 139 MMOL/L — SIGNIFICANT CHANGE UP (ref 135–145)

## 2020-06-13 RX ORDER — SODIUM CHLORIDE 9 MG/ML
1000 INJECTION, SOLUTION INTRAVENOUS
Refills: 0 | Status: DISCONTINUED | OUTPATIENT
Start: 2020-06-13 | End: 2020-06-16

## 2020-06-13 RX ORDER — DIPHENHYDRAMINE HCL 50 MG
50 CAPSULE ORAL AT BEDTIME
Refills: 0 | Status: DISCONTINUED | OUTPATIENT
Start: 2020-06-13 | End: 2020-06-16

## 2020-06-13 RX ADMIN — OXYCODONE HYDROCHLORIDE 5 MILLIGRAM(S): 5 TABLET ORAL at 17:41

## 2020-06-13 RX ADMIN — ALVIMOPAN 12 MILLIGRAM(S): 12 CAPSULE ORAL at 17:13

## 2020-06-13 RX ADMIN — Medication 1000 MILLIGRAM(S): at 12:24

## 2020-06-13 RX ADMIN — SODIUM CHLORIDE 40 MILLILITER(S): 9 INJECTION, SOLUTION INTRAVENOUS at 12:24

## 2020-06-13 RX ADMIN — HEPARIN SODIUM 7500 UNIT(S): 5000 INJECTION INTRAVENOUS; SUBCUTANEOUS at 13:09

## 2020-06-13 RX ADMIN — Medication 1000 MILLIGRAM(S): at 05:29

## 2020-06-13 RX ADMIN — HEPARIN SODIUM 7500 UNIT(S): 5000 INJECTION INTRAVENOUS; SUBCUTANEOUS at 05:29

## 2020-06-13 RX ADMIN — HEPARIN SODIUM 7500 UNIT(S): 5000 INJECTION INTRAVENOUS; SUBCUTANEOUS at 21:32

## 2020-06-13 RX ADMIN — ALVIMOPAN 12 MILLIGRAM(S): 12 CAPSULE ORAL at 05:29

## 2020-06-13 RX ADMIN — Medication 1000 MILLIGRAM(S): at 17:13

## 2020-06-13 RX ADMIN — Medication 1000 MILLIGRAM(S): at 23:33

## 2020-06-13 NOTE — PROGRESS NOTE ADULT - SUBJECTIVE AND OBJECTIVE BOX
POD: 4  Procedure: sigmoid colon resection, SBR, primary bladder repair, ileostomy creation    SUBJECTIVE: Patient seen and examined by chief resident on morning rounds. Reports no pain at this moment. Gas and stool in ostomy bag. Voiding through golden. Tolerating CLD with no n/v.       Vital Signs Last 24 Hrs  T(C): 36.8 (13 Jun 2020 05:28), Max: 37.1 (12 Jun 2020 16:40)  T(F): 98.2 (13 Jun 2020 05:28), Max: 98.7 (12 Jun 2020 16:40)  HR: 96 (13 Jun 2020 05:28) (92 - 96)  BP: 138/82 (13 Jun 2020 05:28) (134/84 - 153/97)  BP(mean): --  RR: 17 (13 Jun 2020 05:28) (16 - 18)  SpO2: 93% (13 Jun 2020 05:28) (93% - 95%)    Physical Exam:  General: NAD  Pulmonary: Nonlabored breathing, no respiratory distress  Abdominal: soft, mildly distended, nontender throughout with no rebound or guarding, incisions CDI, ostomy pink and patent with gas and liquid stool in the bag  Extremities: WWP, normal strength, no clubbing/cyanosis/edema  Neuro: A/O x3    Lines/drains/tubes:    I&O's Summary    12 Jun 2020 07:01  -  13 Jun 2020 07:00  --------------------------------------------------------  IN: 3500 mL / OUT: 3405 mL / NET: 95 mL        LABS:                        13.5   10.90 )-----------( 264      ( 12 Jun 2020 07:29 )             42.4     06-12    139  |  101  |  17  ----------------------------<  119<H>  4.1   |  24  |  0.83    Ca    8.9      12 Jun 2020 07:29  Phos  3.9     06-12  Mg     2.2     06-12          CAPILLARY BLOOD GLUCOSE            RADIOLOGY & ADDITIONAL STUDIES:

## 2020-06-13 NOTE — PROGRESS NOTE ADULT - ASSESSMENT
34 year old male with PMH of Crohn's disease recently on Stelara, now s/p sigmoidectomy, small bowel resection, primary bladder repair, ileostomy creation 6/9 POD     - CLD/IVF  - Pain/Nausea Control   - Oconnor for 5 days   - NIDA x 2: Pelvis, Under Skin   - DVT PPX/SCD/IS   - Ostomy teaching   - AM Labs

## 2020-06-13 NOTE — PROGRESS NOTE ADULT - ATTENDING COMMENTS
Less bloating than yesterday. Reports air in appliance.  Output 1200cc/24h liquid.  u/o excellent  Abd soft, mod distended. Incision clean  Ileostomy p/p/p    A/P: POD 4 right and sigmoid colectomy, repair of bladder fistula. Ileus  1. Clears  2. IV D5 1/2NS at 40cc/h  3. OOB encouraged.  4. Keep golden for bladder repair

## 2020-06-14 LAB
ANION GAP SERPL CALC-SCNC: 14 MMOL/L — SIGNIFICANT CHANGE UP (ref 5–17)
BUN SERPL-MCNC: 13 MG/DL — SIGNIFICANT CHANGE UP (ref 7–23)
CALCIUM SERPL-MCNC: 8.6 MG/DL — SIGNIFICANT CHANGE UP (ref 8.4–10.5)
CHLORIDE SERPL-SCNC: 99 MMOL/L — SIGNIFICANT CHANGE UP (ref 96–108)
CO2 SERPL-SCNC: 24 MMOL/L — SIGNIFICANT CHANGE UP (ref 22–31)
CREAT SERPL-MCNC: 0.79 MG/DL — SIGNIFICANT CHANGE UP (ref 0.5–1.3)
GLUCOSE SERPL-MCNC: 102 MG/DL — HIGH (ref 70–99)
MAGNESIUM SERPL-MCNC: 2 MG/DL — SIGNIFICANT CHANGE UP (ref 1.6–2.6)
PHOSPHATE SERPL-MCNC: 3.8 MG/DL — SIGNIFICANT CHANGE UP (ref 2.5–4.5)
POTASSIUM SERPL-MCNC: 4.2 MMOL/L — SIGNIFICANT CHANGE UP (ref 3.5–5.3)
POTASSIUM SERPL-SCNC: 4.2 MMOL/L — SIGNIFICANT CHANGE UP (ref 3.5–5.3)
SODIUM SERPL-SCNC: 137 MMOL/L — SIGNIFICANT CHANGE UP (ref 135–145)

## 2020-06-14 RX ORDER — SODIUM CHLORIDE 9 MG/ML
1000 INJECTION, SOLUTION INTRAVENOUS ONCE
Refills: 0 | Status: COMPLETED | OUTPATIENT
Start: 2020-06-14 | End: 2020-06-14

## 2020-06-14 RX ADMIN — Medication 1000 MILLIGRAM(S): at 23:52

## 2020-06-14 RX ADMIN — Medication 1000 MILLIGRAM(S): at 17:42

## 2020-06-14 RX ADMIN — HEPARIN SODIUM 7500 UNIT(S): 5000 INJECTION INTRAVENOUS; SUBCUTANEOUS at 05:53

## 2020-06-14 RX ADMIN — OXYCODONE HYDROCHLORIDE 5 MILLIGRAM(S): 5 TABLET ORAL at 04:40

## 2020-06-14 RX ADMIN — OXYCODONE HYDROCHLORIDE 5 MILLIGRAM(S): 5 TABLET ORAL at 21:21

## 2020-06-14 RX ADMIN — Medication 1000 MILLIGRAM(S): at 05:53

## 2020-06-14 RX ADMIN — ALVIMOPAN 12 MILLIGRAM(S): 12 CAPSULE ORAL at 05:53

## 2020-06-14 RX ADMIN — HEPARIN SODIUM 7500 UNIT(S): 5000 INJECTION INTRAVENOUS; SUBCUTANEOUS at 13:00

## 2020-06-14 RX ADMIN — HEPARIN SODIUM 7500 UNIT(S): 5000 INJECTION INTRAVENOUS; SUBCUTANEOUS at 21:21

## 2020-06-14 RX ADMIN — ALVIMOPAN 12 MILLIGRAM(S): 12 CAPSULE ORAL at 17:42

## 2020-06-14 RX ADMIN — SODIUM CHLORIDE 1000 MILLILITER(S): 9 INJECTION, SOLUTION INTRAVENOUS at 05:53

## 2020-06-14 RX ADMIN — Medication 1000 MILLIGRAM(S): at 13:00

## 2020-06-14 NOTE — PROGRESS NOTE ADULT - SUBJECTIVE AND OBJECTIVE BOX
INTERVAL HPI: Patient seen and examined at bedside by chief resident. No acute events overnight. No N/V this AM, but has been consuming minimal CLD 2/2 low appetite. Also endorsing intermittent cramping abdominal pain consistent with gas that is superimposed on a baseline constantly present dull abdominal pain. Somewhat controlled with regimen. Ostomy has been producing large volumes of dark green liquidy output. Oconnor has been without issue.     heparin   Injectable 7500        Vital Signs Last 24 Hrs  T(C): 36.6 (14 Jun 2020 05:14), Max: 37.2 (13 Jun 2020 17:30)  T(F): 97.8 (14 Jun 2020 05:14), Max: 98.9 (13 Jun 2020 17:30)  HR: 95 (14 Jun 2020 05:14) (88 - 100)  BP: 129/78 (14 Jun 2020 05:14) (128/81 - 144/87)  BP(mean): --  RR: 18 (14 Jun 2020 05:14) (17 - 18)  SpO2: 93% (14 Jun 2020 05:14) (93% - 95%)  I&O's Summary    13 Jun 2020 07:01  -  14 Jun 2020 07:00  --------------------------------------------------------  IN: 1440 mL / OUT: 4154.9 mL / NET: -2714.9 mL        Physical Exam:  General: NAD  Pulmonary: Nonlabored breathing, no respiratory distress  Cardiovascular: RRR  Abdominal: soft, mildly distended, some tenderness to palpation over entire abdomen; no rebound or guarding, incisions CDI, ostomy pink and patent with gas and liquid stool in the bag; NIDA x1 ss  Extremities: WWP, no edema        LABS:    06-14    137  |  99  |  13  ----------------------------<  102<H>  4.2   |  24  |  0.79    Ca    8.6      14 Jun 2020 06:38  Phos  3.8     06-14  Mg     2.0     06-14            Assessment and Plan:

## 2020-06-14 NOTE — PROGRESS NOTE ADULT - ATTENDING COMMENTS
Seems a bit less distended.  Large volume ileostomy output  u/o excellent  Tolerating clears  Incision clean  ileostomy p/p/p liquid and air    Continue IV hydration  Monitor stoma output  Encouraged OOB, minimizing opioids  Continue clear liquids  Keep golden for bladder repair.

## 2020-06-14 NOTE — PROGRESS NOTE ADULT - ASSESSMENT
34 year old male with PMH of Crohn's disease recently on Stelara, now s/p sigmoidectomy, small bowel resection, primary bladder repair, ileostomy creation 6/9     - CLD/IVF  - Pain/Nausea Control   - Oconnor; likely d/c 6/15  - NIDA x 2: Pelvis, Under Skin   - DVT PPX/SCD/IS   - Ostomy teaching   - AM Labs

## 2020-06-15 LAB
ANION GAP SERPL CALC-SCNC: 12 MMOL/L — SIGNIFICANT CHANGE UP (ref 5–17)
BUN SERPL-MCNC: 11 MG/DL — SIGNIFICANT CHANGE UP (ref 7–23)
CALCIUM SERPL-MCNC: 9 MG/DL — SIGNIFICANT CHANGE UP (ref 8.4–10.5)
CHLORIDE SERPL-SCNC: 98 MMOL/L — SIGNIFICANT CHANGE UP (ref 96–108)
CO2 SERPL-SCNC: 26 MMOL/L — SIGNIFICANT CHANGE UP (ref 22–31)
CREAT FLD-MCNC: 0.81 MG/DL — SIGNIFICANT CHANGE UP
CREAT SERPL-MCNC: 0.76 MG/DL — SIGNIFICANT CHANGE UP (ref 0.5–1.3)
GLUCOSE SERPL-MCNC: 101 MG/DL — HIGH (ref 70–99)
HCT VFR BLD CALC: 39.5 % — SIGNIFICANT CHANGE UP (ref 39–50)
HGB BLD-MCNC: 12.1 G/DL — LOW (ref 13–17)
MAGNESIUM SERPL-MCNC: 2.2 MG/DL — SIGNIFICANT CHANGE UP (ref 1.6–2.6)
MCHC RBC-ENTMCNC: 24.1 PG — LOW (ref 27–34)
MCHC RBC-ENTMCNC: 30.6 GM/DL — LOW (ref 32–36)
MCV RBC AUTO: 78.5 FL — LOW (ref 80–100)
NRBC # BLD: 0 /100 WBCS — SIGNIFICANT CHANGE UP (ref 0–0)
PHOSPHATE SERPL-MCNC: 4.2 MG/DL — SIGNIFICANT CHANGE UP (ref 2.5–4.5)
PLATELET # BLD AUTO: 270 K/UL — SIGNIFICANT CHANGE UP (ref 150–400)
POTASSIUM SERPL-MCNC: 4.4 MMOL/L — SIGNIFICANT CHANGE UP (ref 3.5–5.3)
POTASSIUM SERPL-SCNC: 4.4 MMOL/L — SIGNIFICANT CHANGE UP (ref 3.5–5.3)
RBC # BLD: 5.03 M/UL — SIGNIFICANT CHANGE UP (ref 4.2–5.8)
RBC # FLD: 15 % — HIGH (ref 10.3–14.5)
SODIUM SERPL-SCNC: 136 MMOL/L — SIGNIFICANT CHANGE UP (ref 135–145)
WBC # BLD: 10.82 K/UL — HIGH (ref 3.8–10.5)
WBC # FLD AUTO: 10.82 K/UL — HIGH (ref 3.8–10.5)

## 2020-06-15 PROCEDURE — 74019 RADEX ABDOMEN 2 VIEWS: CPT | Mod: 26

## 2020-06-15 RX ADMIN — OXYCODONE HYDROCHLORIDE 5 MILLIGRAM(S): 5 TABLET ORAL at 10:05

## 2020-06-15 RX ADMIN — HEPARIN SODIUM 7500 UNIT(S): 5000 INJECTION INTRAVENOUS; SUBCUTANEOUS at 05:05

## 2020-06-15 RX ADMIN — Medication 1000 MILLIGRAM(S): at 18:03

## 2020-06-15 RX ADMIN — Medication 1000 MILLIGRAM(S): at 05:05

## 2020-06-15 RX ADMIN — ALVIMOPAN 12 MILLIGRAM(S): 12 CAPSULE ORAL at 18:03

## 2020-06-15 RX ADMIN — Medication 1000 MILLIGRAM(S): at 23:34

## 2020-06-15 RX ADMIN — ALVIMOPAN 12 MILLIGRAM(S): 12 CAPSULE ORAL at 05:05

## 2020-06-15 RX ADMIN — HEPARIN SODIUM 7500 UNIT(S): 5000 INJECTION INTRAVENOUS; SUBCUTANEOUS at 14:19

## 2020-06-15 RX ADMIN — Medication 1000 MILLIGRAM(S): at 11:43

## 2020-06-15 RX ADMIN — HEPARIN SODIUM 7500 UNIT(S): 5000 INJECTION INTRAVENOUS; SUBCUTANEOUS at 23:34

## 2020-06-15 NOTE — PROGRESS NOTE ADULT - SUBJECTIVE AND OBJECTIVE BOX
SUBJECTIVE: Tolerating CLD and ambulating      OBJECTIVE:    Vital Signs:  Vital Signs Last 24 Hrs  T(C): 36.6 (15 Leonel 2020 05:38), Max: 37 (14 Jun 2020 20:18)  T(F): 97.8 (15 Leonel 2020 05:38), Max: 98.6 (14 Jun 2020 20:18)  HR: 90 (15 Leonel 2020 05:38) (88 - 94)  BP: 112/77 (15 Leonel 2020 05:38) (112/77 - 146/74)  BP(mean): 89 (15 Leonel 2020 05:38) (89 - 89)  RR: 17 (15 Leonel 2020 05:38) (17 - 17)  SpO2: 96% (15 Leonel 2020 05:38) (95% - 97%)    Physical Exam:  General: NAD  Pulmonary: Nonlabored breathing, no respiratory distress  Abdominal: Soft, lower quadrants tender to deep palpation, midline incision with staples CDI, NIDA with serosanguinous output x2, ostomy with stool and gas in appliance  : Oconnor draining clear yellow urine    Ins and Outs:  I&O's Summary    14 Jun 2020 07:01  -  15 Leonel 2020 07:00  --------------------------------------------------------  IN: 840 mL / OUT: 2779.5 mL / NET: -1939.5 mL        Labs:                        12.1   10.82 )-----------( 270      ( 15 Leonel 2020 05:20 )             39.5     06-15    136  |  98  |  11  ----------------------------<  101<H>  4.4   |  26  |  0.76    Ca    9.0      15 Leonel 2020 05:20  Phos  4.2     06-15  Mg     2.2     06-15          CAPILLARY BLOOD GLUCOSE

## 2020-06-15 NOTE — PROGRESS NOTE ADULT - ASSESSMENT
34 year old male with PMH of Crohn's disease recently on Stelara, now s/p sigmoidectomy, small bowel resection, primary bladder repair, ileostomy creation 6/9     - Adv to LRD  - IVHL  - Left NIDA drain contents for Creatinine  - Remove Right NIDA drain  - KUB/abdominal 2 view  - Pain/Nausea Control   - Oconnor; f/u  for removal  - DVT PPX/SCD/IS

## 2020-06-15 NOTE — ADVANCED PRACTICE NURSE CONSULT - ASSESSMENT
New 1 3/4" [place] appliance placed with patient observing. Stoma remains pink and budded, slight rash noted at peristomal area. Reviewed all steps with patient on how to change appliance, frequency of emptying, bathing with appliance. Effluent is dark green liquid. Pt cut skin barrier independently, needed some assistance with stoma ring and applying barrier due to being in bed instead of standing up. Pt also emptied appliance by sitting on side of bed and emptying it into container.

## 2020-06-15 NOTE — PROGRESS NOTE ADULT - ASSESSMENT
Assessment: 34M PMH of Crohn's disease recently on Stelara, now s/p sigmoidectomy, small bowel resection, primary bladder repair, ileostomy creation 6/9/2020.    Recommendations:  - Send NIDA drains for Cr  - Can remove Oconnor catheter once NIDA Cr have resulted from each drain and OK from  team  - Continue care per primary team  - No urologic surgery intervention at this time

## 2020-06-15 NOTE — PROGRESS NOTE ADULT - SUBJECTIVE AND OBJECTIVE BOX
Complains of pain at incision and with movement.  afeb. vss  abd- soft,   wound clean  Nida serosang    Stable.  Advised check NIDA for creat. possible dc [place] today.  trial of advancing diet  check kub.

## 2020-06-15 NOTE — PROGRESS NOTE ADULT - SUBJECTIVE AND OBJECTIVE BOX
INTERVAL HPI: Patient seen and examined at bedside by chief resident. No acute events overnight. Tolerating CLD, no N/V. Ostomy with gas & liquid output. Endorses cramping abdominal pain that is intermittent as well as incisional pain that is worsened with movement. Somewhat controlled with current regimen. Oconnor is in place, without any issues. Has been OOBA.     heparin   Injectable 7500        Vital Signs Last 24 Hrs  T(C): 36.6 (15 Leonel 2020 05:38), Max: 37 (14 Jun 2020 20:18)  T(F): 97.8 (15 Leonel 2020 05:38), Max: 98.6 (14 Jun 2020 20:18)  HR: 90 (15 Leonel 2020 05:38) (88 - 94)  BP: 112/77 (15 Leonel 2020 05:38) (112/77 - 146/74)  BP(mean): 89 (15 Leonel 2020 05:38) (89 - 89)  RR: 17 (15 Leonel 2020 05:38) (17 - 17)  SpO2: 96% (15 Leonel 2020 05:38) (95% - 97%)  I&O's Summary    14 Jun 2020 07:01  -  15 Leonel 2020 07:00  --------------------------------------------------------  IN: 840 mL / OUT: 2779.5 mL / NET: -1939.5 mL        Physical Exam:  General: NAD  Pulmonary: Nonlabored breathing, no respiratory distress  Cardiovascular: RRR  Abdominal: soft, mildly distended, some tenderness to palpation over entire abdomen; no rebound or guarding, incisions CDI, ostomy pink and patent with gas and liquid stool in the bag; NIDA x2 ss  Extremities: WWP, no edema        LABS:                        12.1   10.82 )-----------( 270      ( 15 Leonel 2020 05:20 )             39.5     06-15    136  |  98  |  11  ----------------------------<  101<H>  4.4   |  26  |  0.76    Ca    9.0      15 Leonel 2020 05:20  Phos  4.2     06-15  Mg     2.2     06-15            Assessment and Plan:

## 2020-06-16 ENCOUNTER — TRANSCRIPTION ENCOUNTER (OUTPATIENT)
Age: 35
End: 2020-06-16

## 2020-06-16 VITALS
RESPIRATION RATE: 17 BRPM | HEART RATE: 95 BPM | TEMPERATURE: 99 F | OXYGEN SATURATION: 97 % | SYSTOLIC BLOOD PRESSURE: 130 MMHG | DIASTOLIC BLOOD PRESSURE: 81 MMHG

## 2020-06-16 LAB
ANION GAP SERPL CALC-SCNC: 12 MMOL/L — SIGNIFICANT CHANGE UP (ref 5–17)
BUN SERPL-MCNC: 13 MG/DL — SIGNIFICANT CHANGE UP (ref 7–23)
CALCIUM SERPL-MCNC: 8.9 MG/DL — SIGNIFICANT CHANGE UP (ref 8.4–10.5)
CHLORIDE SERPL-SCNC: 98 MMOL/L — SIGNIFICANT CHANGE UP (ref 96–108)
CO2 SERPL-SCNC: 25 MMOL/L — SIGNIFICANT CHANGE UP (ref 22–31)
CREAT FLD-MCNC: 0.89 MG/DL — SIGNIFICANT CHANGE UP
CREAT SERPL-MCNC: 0.79 MG/DL — SIGNIFICANT CHANGE UP (ref 0.5–1.3)
GLUCOSE SERPL-MCNC: 108 MG/DL — HIGH (ref 70–99)
HCT VFR BLD CALC: 40.2 % — SIGNIFICANT CHANGE UP (ref 39–50)
HGB BLD-MCNC: 12.4 G/DL — LOW (ref 13–17)
MAGNESIUM SERPL-MCNC: 2.1 MG/DL — SIGNIFICANT CHANGE UP (ref 1.6–2.6)
MCHC RBC-ENTMCNC: 24 PG — LOW (ref 27–34)
MCHC RBC-ENTMCNC: 30.8 GM/DL — LOW (ref 32–36)
MCV RBC AUTO: 77.9 FL — LOW (ref 80–100)
NRBC # BLD: 0 /100 WBCS — SIGNIFICANT CHANGE UP (ref 0–0)
PHOSPHATE SERPL-MCNC: 4 MG/DL — SIGNIFICANT CHANGE UP (ref 2.5–4.5)
PLATELET # BLD AUTO: 287 K/UL — SIGNIFICANT CHANGE UP (ref 150–400)
POTASSIUM SERPL-MCNC: 4.2 MMOL/L — SIGNIFICANT CHANGE UP (ref 3.5–5.3)
POTASSIUM SERPL-SCNC: 4.2 MMOL/L — SIGNIFICANT CHANGE UP (ref 3.5–5.3)
RBC # BLD: 5.16 M/UL — SIGNIFICANT CHANGE UP (ref 4.2–5.8)
RBC # FLD: 15 % — HIGH (ref 10.3–14.5)
SODIUM SERPL-SCNC: 135 MMOL/L — SIGNIFICANT CHANGE UP (ref 135–145)
SPECIMEN SOURCE FLD: SIGNIFICANT CHANGE UP
WBC # BLD: 9.9 K/UL — SIGNIFICANT CHANGE UP (ref 3.8–10.5)
WBC # FLD AUTO: 9.9 K/UL — SIGNIFICANT CHANGE UP (ref 3.8–10.5)

## 2020-06-16 PROCEDURE — C1889: CPT

## 2020-06-16 PROCEDURE — 88304 TISSUE EXAM BY PATHOLOGIST: CPT

## 2020-06-16 PROCEDURE — 85025 COMPLETE CBC W/AUTO DIFF WBC: CPT

## 2020-06-16 PROCEDURE — 83735 ASSAY OF MAGNESIUM: CPT

## 2020-06-16 PROCEDURE — C9399: CPT

## 2020-06-16 PROCEDURE — 84100 ASSAY OF PHOSPHORUS: CPT

## 2020-06-16 PROCEDURE — 82570 ASSAY OF URINE CREATININE: CPT

## 2020-06-16 PROCEDURE — 85027 COMPLETE CBC AUTOMATED: CPT

## 2020-06-16 PROCEDURE — 80048 BASIC METABOLIC PNL TOTAL CA: CPT

## 2020-06-16 PROCEDURE — 74018 RADEX ABDOMEN 1 VIEW: CPT

## 2020-06-16 PROCEDURE — 82962 GLUCOSE BLOOD TEST: CPT

## 2020-06-16 PROCEDURE — 36415 COLL VENOUS BLD VENIPUNCTURE: CPT

## 2020-06-16 PROCEDURE — 74019 RADEX ABDOMEN 2 VIEWS: CPT

## 2020-06-16 PROCEDURE — 83036 HEMOGLOBIN GLYCOSYLATED A1C: CPT

## 2020-06-16 PROCEDURE — 88307 TISSUE EXAM BY PATHOLOGIST: CPT

## 2020-06-16 RX ORDER — DIPHENOXYLATE HCL/ATROPINE 2.5-.025MG
1 TABLET ORAL
Qty: 30 | Refills: 0
Start: 2020-06-16 | End: 2020-06-30

## 2020-06-16 RX ORDER — OXYCODONE HYDROCHLORIDE 5 MG/1
1 TABLET ORAL
Qty: 15 | Refills: 0
Start: 2020-06-16

## 2020-06-16 RX ORDER — ENOXAPARIN SODIUM 100 MG/ML
1 INJECTION SUBCUTANEOUS
Qty: 56 | Refills: 0
Start: 2020-06-16 | End: 2020-07-13

## 2020-06-16 RX ORDER — ACETAMINOPHEN 500 MG
2 TABLET ORAL
Qty: 0 | Refills: 0 | DISCHARGE
Start: 2020-06-16

## 2020-06-16 RX ORDER — DOCUSATE SODIUM 100 MG
1 CAPSULE ORAL
Qty: 16 | Refills: 0
Start: 2020-06-16 | End: 2020-06-23

## 2020-06-16 RX ORDER — ENOXAPARIN SODIUM 100 MG/ML
136 INJECTION SUBCUTANEOUS
Qty: 7616 | Refills: 0
Start: 2020-06-16 | End: 2020-07-13

## 2020-06-16 RX ORDER — ENOXAPARIN SODIUM 100 MG/ML
40 INJECTION SUBCUTANEOUS
Qty: 1680 | Refills: 0
Start: 2020-06-16 | End: 2020-07-06

## 2020-06-16 RX ORDER — ACETAMINOPHEN 500 MG
2 TABLET ORAL
Qty: 80 | Refills: 0
Start: 2020-06-16 | End: 2020-06-25

## 2020-06-16 RX ADMIN — Medication 1000 MILLIGRAM(S): at 11:34

## 2020-06-16 RX ADMIN — Medication 1000 MILLIGRAM(S): at 05:08

## 2020-06-16 RX ADMIN — HEPARIN SODIUM 7500 UNIT(S): 5000 INJECTION INTRAVENOUS; SUBCUTANEOUS at 13:51

## 2020-06-16 RX ADMIN — ALVIMOPAN 12 MILLIGRAM(S): 12 CAPSULE ORAL at 05:08

## 2020-06-16 RX ADMIN — OXYCODONE HYDROCHLORIDE 5 MILLIGRAM(S): 5 TABLET ORAL at 04:18

## 2020-06-16 RX ADMIN — HEPARIN SODIUM 7500 UNIT(S): 5000 INJECTION INTRAVENOUS; SUBCUTANEOUS at 05:07

## 2020-06-16 NOTE — DISCHARGE NOTE PROVIDER - NSDCCPTREATMENT_GEN_ALL_CORE_FT
PRINCIPAL PROCEDURE  Procedure: Open resection, sigmoid colon  Findings and Treatment: General Discharge Instructions:  Please resume all regular home medications unless specifically advised not to take a particular medication. Also, please take any new medications as prescribed.  Please get plenty of rest, continue to ambulate several times per day, and drink adequate amounts of fluids. Avoid lifting weights greater than 5-10 lbs until you follow-up with your surgeon, who will instruct you further regarding activity restrictions.  Avoid driving or operating heavy machinery while taking pain medications.  Please follow-up with your surgeon and Primary Care Provider (PCP) as advised.  Incision Care:  *Please call your doctor or nurse practitioner if you have increased pain, swelling, redness, or drainage from the incision site.  *Avoid swimming and baths until your follow-up appointment.  *You may shower, and wash surgical incisions with a mild soap and warm water. Gently pat the area dry.  *If you have staples, they will be removed at your follow-up appointment.  Warning Signs:  Please call your doctor or nurse practitioner if you experience the following:  *You experience new chest pain, pressure, squeezing or tightness.  *New or worsening cough, shortness of breath, or wheeze.  *If you are vomiting and cannot keep down fluids or your medications.  *You are getting dehydrated due to continued vomiting, diarrhea, or other reasons. Signs of dehydration include dry mouth, rapid heartbeat, or feeling dizzy or faint when standing.  *You see blood or dark/black material when you vomit or have a bowel movement.  *You experience burning when you urinate, have blood in your urine, or experience a discharge.  *Your pain is not improving within 8-12 hours or is not gone within 24 hours. Call or return immediately if your pain is getting worse, changes location, or moves to your chest or back.  *You have shaking chills, or fever greater than 101.5 degrees Fahrenheit or 38 degrees Celsius.  *Any change in your symptoms, or any new symptoms that concern you.      SECONDARY PROCEDURE  Procedure: Ileocecal resection  Findings and Treatment: Post-op ilesotomy care instructions:   -Drink at least 2 liters of fluid daily.   -Record your ileostomy output every time you empty the bag and take note of the consistency. The output should be more like a paste than a liquid.  --If your iseostomy output over a 24-hour period is MORE THAN 1000 mLs AND/OR it is WATERY (rather than pastey), CALL DR. CH'S OFFICE & START LOMOTIL 2.5mg every 12 hours.   --If your ileostomy output over a 24-hour perior is MORE THAN 1500 mLs AND/OR it is WATERY (rather than pastey), CALL DR. CH's OFFICE & increase to LOMOTIL 2.5mg every 8 hours, or every 6 hours if needed.

## 2020-06-16 NOTE — PROGRESS NOTE ADULT - SUBJECTIVE AND OBJECTIVE BOX
SUBJECTIVE: Patient voiding function returned to normal      OBJECTIVE:    Vital Signs:  Vital Signs Last 24 Hrs  T(C): 37 (16 Jun 2020 13:01), Max: 37.3 (15 Leonel 2020 13:49)  T(F): 98.6 (16 Jun 2020 13:01), Max: 99.2 (15 Leonel 2020 13:49)  HR: 95 (16 Jun 2020 13:01) (72 - 99)  BP: 130/81 (16 Jun 2020 13:01) (114/77 - 146/92)  BP(mean): --  RR: 17 (16 Jun 2020 13:01) (17 - 18)  SpO2: 97% (16 Jun 2020 13:01) (93% - 97%)    Physical Exam:  General: NAD  Pulmonary: Nonlabored breathing, no respiratory distress  Abdominal: Soft, lower quadrants tender to deep palpation, midline incision with staples CDI, NIDA with serosanguinous output, ostomy with stool and gas in appliance  : No CVA tenderness bilaterally      Ins and Outs:  I&O's Summary    15 Leonel 2020 07:01  -  16 Jun 2020 07:00  --------------------------------------------------------  IN: 1320 mL / OUT: 2510 mL / NET: -1190 mL    16 Jun 2020 07:01  -  16 Jun 2020 13:34  --------------------------------------------------------  IN: 600 mL / OUT: 982.5 mL / NET: -382.5 mL        Labs:                        12.4   9.90  )-----------( 287      ( 16 Jun 2020 05:46 )             40.2     06-16    135  |  98  |  13  ----------------------------<  108<H>  4.2   |  25  |  0.79    Ca    8.9      16 Jun 2020 05:46  Phos  4.0     06-16  Mg     2.1     06-16          CAPILLARY BLOOD GLUCOSE

## 2020-06-16 NOTE — DISCHARGE NOTE PROVIDER - NSDCCPCAREPLAN_GEN_ALL_CORE_FT
PRINCIPAL DISCHARGE DIAGNOSIS  Diagnosis: Crohn's disease  Assessment and Plan of Treatment: s/p sigmoid colectomy, small bowel resection, bladder repair PRINCIPAL DISCHARGE DIAGNOSIS  Diagnosis: Crohn's disease  Assessment and Plan of Treatment: s/p sigmoid colectomy, small bowel resection, bladder repair  Please follow direction for taking LOVENOX DAILY for the next 28 days. It is vital that you take the medication as prescribed.

## 2020-06-16 NOTE — DISCHARGE NOTE PROVIDER - NSDCMRMEDTOKEN_GEN_ALL_CORE_FT
acetaminophen 500 mg oral tablet: 2 tab(s) orally every 6 hours  enoxaparin 120 mg/0.8 mL injectable solution: 136 milligram(s) subcutaneously 2 times a day MDD:276mg   oxyCODONE 5 mg oral tablet: 1 tab(s) orally every 4 hours, As needed, Moderate Pain (4 - 6) MDD:4 acetaminophen 500 mg oral tablet: 2 tab(s) orally every 6 hours  oxyCODONE 5 mg oral tablet: 1 tab(s) orally every 4 hours, As needed, Moderate Pain (4 - 6) MDD:4 acetaminophen 500 mg oral tablet: 2 tab(s) orally every 6 hours  enoxaparin 40 mg/0.4 mL injectable solution: 1 milligram(s) subcutaneously 2 times a day MDD:2 injections per day  Lomotil 2.5 mg-0.025 mg oral tablet: 1 tab(s) orally 2 times a day, As Needed -for diarrhea MDD:2 tablets daily   oxyCODONE 5 mg oral tablet: 1 tab(s) orally every 4 hours, As needed, Moderate Pain (4 - 6) MDD:4 Colace 100 mg oral capsule: 1 cap(s) orally 2 times a day, As Needed for constipation MDD:2 tabs 1 3/4 [place] Drainable Pouches #35672: Apply topically to affected area once   1 3/4 Yenni Skin Barrier #[id number]: Apply topically to affected area once a day   Adapt Barrier Rings 2&quot; #8805: Apply topically to affected area once   Adapt Ostomy Belt Large #7299: Apply topically to affected area once   Adapt Stoma Powder #7906: Apply topically to affected area once   Colace 100 mg oral capsule: 1 cap(s) orally 2 times a day, As Needed for constipation MDD:2 tabs   enoxaparin 40 mg/0.4 mL injectable solution: 40 milligram(s) subcutaneously 2 times a day MDD:2 injections per day  oxycodone-acetaminophen 5 mg-325 mg oral tablet: 1 tab(s) orally every 6 hours, As Needed -for severe pain MDD:4 tabs   Skin Prep #7982: Apply topically to affected area once a day

## 2020-06-16 NOTE — DISCHARGE NOTE PROVIDER - PROVIDER TOKENS
PROVIDER:[TOKEN:[[id number]],FOLLOWUP:[1 week]],PROVIDER:[TOKEN:[[id number]],FOLLOWUP:[1 week]]

## 2020-06-16 NOTE — ADVANCED PRACTICE NURSE CONSULT - RECOMMEDATIONS
[facility] will continue to follow patient until discharge.
No other needs at this time.
Will continue to follow until discharge.

## 2020-06-16 NOTE — DISCHARGE NOTE PROVIDER - NSDCFUADDINST_GEN_ALL_CORE_FT
Please continue a LOW-FIBER DIET. Listed below are some foods you may eat and those you should avoid.   --Allowed foods:  White bread without nuts and seeds  White rice, plain white pasta, and crackers  Refined hot cereals, such as Cream of Wheat, or cold cereals with less than 1 gram of fiber per serving  Pancakes or waffles made from white refined flour  Most canned or well-cooked vegetables and fruits without skins or seeds  Fruit and vegetable juice with little or no pulp, fruit-flavored drinks, and flavored otaes  Tender meat, poultry, fish, eggs and tofu  Milk and foods made from milk — such as yogurt, pudding, ice cream, cheeses and sour cream — if tolerated  Butter, margarine, oils and salad dressings without seeds  --Foods to avoid:  Whole-wheat or whole-grain breads, cereals and pasta  Brown or wild rice and other whole grains, such as oats, kasha, barley and quinoa  Dried fruits and prune juice  Raw fruit, including those with seeds, skin or membranes, such as berries  Raw or undercooked vegetables, including corn  Dried beans, peas and lentils  Seeds and nuts and foods containing them, including peanut butter and other nut butters  Coconut  Popcorn  General Discharge Instructions:  Please resume all regular home medications unless specifically advised not to take a particular medication. Also, please take any new medications as prescribed.  Please get plenty of rest, continue to ambulate several times per day, and drink adequate amounts of fluids. Avoid lifting weights greater than 5-10 lbs until you follow-up with your surgeon, who will instruct you further regarding activity restrictions.  Avoid driving or operating heavy machinery while taking pain medications.  Please follow-up with your surgeon and Primary Care Provider (PCP) as advised.  Incision Care:  *Please call your doctor or nurse practitioner if you have increased pain, swelling, redness, or drainage from the incision site.  *Avoid swimming and baths until your follow-up appointment.  *You may shower, and wash surgical incisions with a mild soap and warm water. Gently pat the area dry.  *If you have staples, they will be removed at your follow-up appointment.  *If you have steri-strips, they will fall off on their own. Please remove any remaining strips 7-10 days after surgery.  Warning Signs:  Please call your doctor or nurse practitioner if you experience the following:  *You experience new chest pain, pressure, squeezing or tightness.  *New or worsening cough, shortness of breath, or wheeze.  *If you are vomiting and cannot keep down fluids or your medications.  *You are getting dehydrated due to continued vomiting, diarrhea, or other reasons. Signs of dehydration include dry mouth, rapid heartbeat, or feeling dizzy or faint when standing.  *You see blood or dark/black material when you vomit or have a bowel movement.  *You experience burning when you urinate, have blood in your urine, or experience a discharge.  *Your pain is not improving within 8-12 hours or is not gone within 24 hours. Call or return immediately if your pain is getting worse, changes location, or moves to your chest or back.  *You have shaking chills, or fever greater than 101.5 degrees Fahrenheit or 38 degrees Celsius.  *Any change in your symptoms, or any new symptoms that concern you.

## 2020-06-16 NOTE — ADVANCED PRACTICE NURSE CONSULT - ASSESSMENT
Pt declined to have appliance changed for more practice. Current appliance intact, reviewed ordering supplies after discharge, pt with extra supplies for home.

## 2020-06-16 NOTE — DISCHARGE NOTE PROVIDER - HOSPITAL COURSE
34 year old male with PMH of Crohn's disease recently on Stelara, presented on day of admission for elective colon resection. Patient recently underwent EGD and colonoscopy on 12/18/18 which found a stricture of ascending colon vs cecal inflammatory polyps that was unable to transverse. Further examination with a CT enterography on 12/23/19 showed multiple complex fistulas involving the sigmoid colon and distal terminal ileum as well as an enterovesicular fistula. Patient underwent exploratory laparotomy and sigmoid resection, small bowel resection, with ileostomy creation with bladder repair done by urology and was admitted post operatively for further management and monitoring. Patient began having ostomy fecal output on POD 1, but continued on clear liquids and tolerated. Patient had a KUB done on POD 3 which showed dilated loops of small bowel with paucity of gas within the large bowel suspiscious of early obstruction vs severe ileus; patient was kept on a clear liquid diet and continued to tolerate. Patient began to have high ostomy output POD 5 but lessened throughout rest of post op course. Patient was advanced to a low residue diet on POD 6 and tolerated. On day of discharge patient was cleared to go home.

## 2020-06-16 NOTE — PROGRESS NOTE ADULT - ASSESSMENT
34 year old male with PMH of Crohn's disease recently on Stelara, now s/p sigmoidectomy, small bowel resection, primary bladder repair, ileostomy creation 6/9     - LRD  - Will remove Left NIDA later today  - Given ostomy output, will consider Lomotil  - Pain/Nausea Control   - DVT PPX/SCD/IS   -  recs

## 2020-06-16 NOTE — PROGRESS NOTE ADULT - ASSESSMENT
Assessment: 34M PMH of Crohn's disease recently on Stelara, now s/p sigmoidectomy, small bowel resection, primary bladder repair, ileostomy creation 6/9/2020. Patient's voiding has returned to baseline and NIDA Cr after Oconnor catheter removal is similar to serum Cr, suggesting that there is no bladder leak.    Recommendations:  - Follow up with Dr. Chisholm 1-2 weeks after discharge to monitor bladder repair  - Continue care per primary team  - No urologic surgery intervention at this time

## 2020-06-16 NOTE — PROGRESS NOTE ADULT - REASON FOR ADMISSION
colon resection

## 2020-06-16 NOTE — PROGRESS NOTE ADULT - SUBJECTIVE AND OBJECTIVE BOX
INTERVAL HPI: Patient seen and examined at bedside by chief resident. No acute events overnight. Tolerating LRD w/o N/V. Ostomy output more formed this AM compared to yesterday; still considerable in volume. Endorses intermittent cramping abdominal pain that had improved initially with removal of R NIDA but is now present again. Oconnor removed yesterday AM; voiding w/o issues. OOBA.     heparin   Injectable 7500        Vital Signs Last 24 Hrs  T(C): 36.7 (16 Jun 2020 05:44), Max: 37.3 (15 Leonel 2020 13:49)  T(F): 98.1 (16 Jun 2020 05:44), Max: 99.2 (15 Leonel 2020 13:49)  HR: 72 (16 Jun 2020 05:44) (72 - 99)  BP: 146/92 (16 Jun 2020 05:44) (115/64 - 146/92)  BP(mean): --  RR: 18 (16 Jun 2020 05:44) (18 - 18)  SpO2: 97% (16 Jun 2020 05:44) (93% - 98%)  I&O's Summary    15 Leonel 2020 07:01  -  16 Jun 2020 07:00  --------------------------------------------------------  IN: 1320 mL / OUT: 2510 mL / NET: -1190 mL        Physical Exam:  General: NAD  Pulmonary: Nonlabored breathing, no respiratory distress  Cardiovascular: RRR  Abdominal: soft, mildly distended, some tenderness to palpation over entire abdomen; no rebound or guarding, incisions CDI, ostomy pink and patent with gas and liquid stool in the bag; JPx1   Extremities: WWP, no edema        LABS:                        12.4   9.90  )-----------( 287      ( 16 Jun 2020 05:46 )             40.2     06-16    135  |  98  |  13  ----------------------------<  108<H>  4.2   |  25  |  0.79    Ca    8.9      16 Jun 2020 05:46  Phos  4.0     06-16  Mg     2.1     06-16            Assessment and Plan:

## 2020-06-16 NOTE — DISCHARGE NOTE PROVIDER - CARE PROVIDER_API CALL
German Booker  Colon and Rectal Surgery  [address] [place] [address]  Phone: [phone]  Fax: [phone]  Follow Up Time: 1 week    Blake Chisholm  UROLOGY  [address] [address]  Phone: [phone]  Fax: [phone]  Follow Up Time: 1 week

## 2020-06-17 PROBLEM — K50.90 CROHN'S DISEASE, UNSPECIFIED, WITHOUT COMPLICATIONS: Chronic | Status: ACTIVE | Noted: 2020-06-09

## 2020-06-20 DIAGNOSIS — E66.01 MORBID (SEVERE) OBESITY DUE TO EXCESS CALORIES: ICD-10-CM

## 2020-06-20 DIAGNOSIS — L40.9 PSORIASIS, UNSPECIFIED: ICD-10-CM

## 2020-06-20 DIAGNOSIS — K56.7 ILEUS, UNSPECIFIED: ICD-10-CM

## 2020-06-20 DIAGNOSIS — K50.113 CROHN'S DISEASE OF LARGE INTESTINE WITH FISTULA: ICD-10-CM

## 2020-06-20 DIAGNOSIS — K50.913 CROHN'S DISEASE, UNSPECIFIED, WITH FISTULA: ICD-10-CM

## 2020-06-22 ENCOUNTER — APPOINTMENT (OUTPATIENT)
Dept: UROLOGY | Facility: CLINIC | Age: 35
End: 2020-06-22
Payer: COMMERCIAL

## 2020-06-22 ENCOUNTER — APPOINTMENT (OUTPATIENT)
Dept: COLORECTAL SURGERY | Facility: CLINIC | Age: 35
End: 2020-06-22
Payer: COMMERCIAL

## 2020-06-22 VITALS — TEMPERATURE: 97.8 F | SYSTOLIC BLOOD PRESSURE: 139 MMHG | DIASTOLIC BLOOD PRESSURE: 83 MMHG | HEART RATE: 99 BPM

## 2020-06-22 VITALS
TEMPERATURE: 98.8 F | BODY MASS INDEX: 42.59 KG/M2 | HEART RATE: 123 BPM | SYSTOLIC BLOOD PRESSURE: 148 MMHG | DIASTOLIC BLOOD PRESSURE: 86 MMHG | WEIGHT: 281 LBS | HEIGHT: 68 IN

## 2020-06-22 DIAGNOSIS — B36.9 SUPERFICIAL MYCOSIS, UNSPECIFIED: ICD-10-CM

## 2020-06-22 DIAGNOSIS — N32.1 VESICOINTESTINAL FISTULA: ICD-10-CM

## 2020-06-22 DIAGNOSIS — B37.0 CANDIDAL STOMATITIS: ICD-10-CM

## 2020-06-22 DIAGNOSIS — N39.0 URINARY TRACT INFECTION, SITE NOT SPECIFIED: ICD-10-CM

## 2020-06-22 PROCEDURE — 99024 POSTOP FOLLOW-UP VISIT: CPT

## 2020-06-22 NOTE — PHYSICAL EXAM
[Abdomen Masses] : No abdominal masses [No HSM] : no hepatosplenomegaly [de-identified] : Midline incision. Well-healed. Staples in place. Staples removed. Small superficial draining serous fluid at superior aspect Ileostomy functional

## 2020-06-22 NOTE — HISTORY OF PRESENT ILLNESS
[FreeTextEntry1] : 36 yo M w/ fistulizing Crohn's disease with enterovesical fistula s/p ex lap and sigmoid resection, small bowel resection with ileostomy creation and takedown of entero-colonic fistula, repair of bladder fistula (Urology assist) on 6/9/20\par \par Pt presents today c/o "funny taste" in mouth, feels "gel coating" on tongue and roof of mouth. Reports hx of fever 100.9, has been taking Tylenol every 6 hours for pain. Used oxycodone as needed for pain, has not used in the last two days.\par Appetite still low. Has been eating starches, protein and few well cooked vegetables. Has not advanced dietary fiber\par Denies SOB or fever. Denies body aches or chills.\par Currently using Lovenox twice daily\par Denies starting Colace\par c/o skin irritation surrounding stoma. VNS coming twice weekly\par BH: Emptying bag 5-6 times daily, pudding like consistency\par \par \par Surgical Pathology\par Final Diagnosis\par \par 1. Terminal ileum, ascending colon, resection:\par - Ileal and partial colectomy specimen with mildly active\par ileitis with mild glandular distortion at the ileocecal valve.\par - Colon, with focal mild active inflammation with cryptitis\par and appendiceal orifice site with mild glandular distortion and\par transmural fibrosis.\par - Mild acute and chronic serositis and serosal fibrosis,\par adhesions present\par - Three unremarkable regional lymph nodes identified (0/3).\par -Ileal and colonic resection margins without significant\par pathology.\par \par 2. Sigmoid colon, rectum, resection:\par - Colon and rectal resection showing serosal fibrosis\par adhesions and focal intramural fibrosis.\par \par 3.Proximal and distal donuts:\par - Small segments of colonic wall with focal acute serositis\par \par Note: No dysplasia identified

## 2020-06-22 NOTE — ASSESSMENT
[FreeTextEntry1] : Recovering well from surgery. Advised he may increase his diet and activity slowly as tolerated.\par \par Recommend enterostomal followup consultation today.

## 2020-06-23 PROBLEM — N39.0 RECURRENT UTI: Status: ACTIVE | Noted: 2020-06-23

## 2020-06-23 LAB
APPEARANCE: ABNORMAL
BACTERIA: ABNORMAL
BILIRUBIN URINE: NEGATIVE
BLOOD URINE: ABNORMAL
COLOR: YELLOW
GLUCOSE QUALITATIVE U: NEGATIVE
HYALINE CASTS: 1 /LPF
KETONES URINE: NEGATIVE
LEUKOCYTE ESTERASE URINE: ABNORMAL
MICROSCOPIC-UA: NORMAL
NITRITE URINE: POSITIVE
PH URINE: 6
PROTEIN URINE: ABNORMAL
RED BLOOD CELLS URINE: 8 /HPF
SPECIFIC GRAVITY URINE: 1.03
SQUAMOUS EPITHELIAL CELLS: 0 /HPF
UROBILINOGEN URINE: NORMAL
WHITE BLOOD CELLS URINE: 384 /HPF

## 2020-06-23 NOTE — REVIEW OF SYSTEMS
[Fever] : no fever [Chills] : no chills [Abdominal Pain] : abdominal pain [Negative] : Genitourinary

## 2020-06-23 NOTE — HISTORY OF PRESENT ILLNESS
[FreeTextEntry1] : 36yo male with Crohn's disease with enterovesical fistula s/p colon resection and closure of colovesical fistula 6/09. He had recurrent UTI, pneumaturia prior to surgery. Currently voiding well. No dysuria. Healing appropriately from surgery, saw Dr. Booker today.  [Dysuria] : no dysuria [Hematuria - Gross] : no gross hematuria [Weight Loss] : no recent ~M [unfilled] weight loss [Fever] : no fever [Fatigue] : no fatigue [Nausea] : no nausea Neck pain [Incisional Drainage] : no incisional drainage [Incisional Pain] : incisional pain

## 2020-06-23 NOTE — ASSESSMENT
[FreeTextEntry1] : 34yo male with Crohns disease, enterovesical fistula s/p takedown, closure of fistula 6/09\par Recovering appropriately\par F/u 3 months or sooner prn

## 2020-06-23 NOTE — PHYSICAL EXAM
[General Appearance - Well Developed] : well developed [General Appearance - Well Nourished] : well nourished [Normal Appearance] : normal appearance [General Appearance - In No Acute Distress] : no acute distress [Well Groomed] : well groomed [Abdomen Soft] : soft [Abdomen Tenderness] : non-tender [Costovertebral Angle Tenderness] : no ~M costovertebral angle tenderness

## 2020-06-25 LAB — BACTERIA UR CULT: ABNORMAL

## 2020-06-29 ENCOUNTER — APPOINTMENT (OUTPATIENT)
Dept: GASTROENTEROLOGY | Facility: CLINIC | Age: 35
End: 2020-06-29
Payer: COMMERCIAL

## 2020-06-29 PROCEDURE — 99442: CPT

## 2020-06-29 RX ORDER — NITROFURANTOIN (MONOHYDRATE/MACROCRYSTALS) 25; 75 MG/1; MG/1
100 CAPSULE ORAL DAILY
Qty: 17 | Refills: 0 | Status: DISCONTINUED | COMMUNITY
Start: 2020-01-13 | End: 2020-06-29

## 2020-06-29 RX ORDER — NYSTATIN 100000 [USP'U]/ML
100000 SUSPENSION ORAL 4 TIMES DAILY
Qty: 140 | Refills: 0 | Status: DISCONTINUED | COMMUNITY
Start: 2020-06-22 | End: 2020-06-29

## 2020-06-29 RX ORDER — NYSTATIN 100000 1/G
100000 POWDER TOPICAL
Qty: 1 | Refills: 0 | Status: DISCONTINUED | COMMUNITY
Start: 2020-06-22 | End: 2020-06-29

## 2020-06-29 RX ORDER — METRONIDAZOLE 500 MG/1
500 TABLET ORAL
Qty: 6 | Refills: 0 | Status: DISCONTINUED | COMMUNITY
Start: 2020-01-13 | End: 2020-06-29

## 2020-06-30 NOTE — HISTORY OF PRESENT ILLNESS
[Home] : at home, [unfilled] , at the time of the visit. 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 Managing ostomy well, did see Dr. Gonzalez and Dr. Chisholm last week. Dr. Booker reported good healing, Dr. Chisholm did U/A and culture and he still has UTI. Given Augmentin for 5 days. He reports his ostomy output over the last 3 days has increased in output and has been more watery. No blood or abdominal pain. No nausea or vomiting. No fevers or chills. \par \par CTE 9/10/19: stable colo-enteric, interloop and enterovesicular fistula. Mild fibrous stenotic disease of distal ileum. No new disease or acute findings. \par \par No abdominal pain or nausea.vomiting. \par Saw Dr. Booker since last visit for surgical consultation who advised he hold off on surgery until he is more symptomatic. \par He is compliant with his Stelara dosing and denies any ADRs to medication.\par \par Of note, CT scan revealed ground-glass nodule in left lower lobe and saw Dr. Kennedy. Advised to repeat in 3 months for monitoring. \par \par No EIMs. \par \par IBD  history: \par He sees Dr Heck and on his last colonoscopy 2.5 yrs ago, he had a stricture (unclear where) and was recommended surgery. He says he feels okay, does not have any significant symptoms, abd pian/distention/ inc bowel sounds at times with heavy foods, but symptoms resolve over time and he goes on with his life. Also complains of some nausea and possible reflux at times. \par \par Admits he doesn’t take adalimumab on regular maintenance [id number]; unable to tell me what day his injection is...reports that prolonged injection was based on CRP control and not toward standard dosing; denies any issue of difficulty paying/obtaining Rx (though it did come up with IFX infusions)\par \par Denies EIM. No smoking. Last ADA dose was ~ 4 weeks ago. \par + anal fissures\par + psoriasis \par \par Previously had intermittent symptoms of bloating and gassiness. Diarrhea on-off. Has learned to live with them and is not bothered very much by symptoms. \par \par CTE with matted small and large bowel loops and multiple fistulae, scarring\par Colonoscopy with evidence of scarring, pseudopolyps, fistula opening seen. Strictured AC, cecal inflammatory polyps. Unable to advance to TI due to scarring \par EGD with gastric erythema, path (-) for HP\par CRP elevated at 2.5, ADA level low, Ab(+)\par \par Denies tobacco use. \par

## 2020-06-30 NOTE — PHYSICAL EXAM
[General Appearance - In No Acute Distress] : in no acute distress [General Appearance - Alert] : alert [General Appearance - Well Nourished] : well nourished [] : normal voice and communication [General Appearance - Well-Appearing] : healthy appearing [General Appearance - Well Developed] : well developed [Affect] : the affect was normal [Oriented To Time, Place, And Person] : oriented to person, place, and time [Mood] : the mood was normal [FreeTextEntry1] : morbidly obese

## 2020-06-30 NOTE — ASSESSMENT
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 No active freeman-anal disease at this time. Evidence of ongoing inflammation with high CRP. Developed Ab to ADA in the context of intermittent dosing and poor compliance. Clinically has adjusted to the disease and is not very symptomatic. Suspect this is due to decompression of strictured loops by fistulae. Previously exposed to a single dose of IFX, stopped due to out of pocket costs. Now on Ustekinumab maintenance with overall improvement and clinical response, but not remission. After recurrent UTI's with persisten back pain 2/2 enterovesicular fistula, he underwent exp lap and sigmoid bowel resection with ileostomy creation and takedown of entero-colonic fistula, repair of bladder on 6/9/20. He is recovering well, now with increased ostomy output in setting of starting antibiotics for UTI. \par \par # advanced stricturing/ fistulizing ileocolonic CD - s/p surgery on 6/9/20\par - given increase output that is watery, advised he take 1 lomotil 2.5mg which he has at home to decrease diarrhea; advised he increase water intake and fiber (as per [place] NP who will call him to f/u tomorrow)\par - continue Stelara once antibiotics finish (last dose in April) \par - will need to discuss surveillance cscope at f/u given colonic involvement and diagnosed 10 years ago once continuity established \par \par # Enterovesicular fistula - surgically removed \par - persistent UTI in setting of recent surgery, advised he continue to monitor with urology and notify them and us if he becomes symptomatic \par \par # Ground glass nodule in lung\par - cont f/u Dr. Kennedy for monitoring \par \par # HCM - will need focused HCM visit \par - Vit D 20 at last visit; completed oral repletion\par - derm visit emphasized\par - immune to Hep B\par - hep C negative \par - obesity: once acute issues sorted, can refer to endocrine\par - denies tobacco use or NSAID use\par - DEXA to be discussed at next visit \par \par F/U 6 weeks \par \par Giulia Roitman NP

## 2020-06-30 NOTE — CONSULT LETTER
[Dear  ___] : Dear  [unfilled], [Please see my note below.] : Please see my note below. [Sincerely,] : Sincerely, [Courtesy Letter:] : I had the pleasure of seeing your patient, [unfilled], in my office today. [DrPriti  ___] : Dr. CARO [FreeTextEntry3] : Erik Royal MD\par Director, IBD Program\par [facility]\par \par Associate Professor of Medicine\par [employer]\par [employer]\par

## 2020-09-25 ENCOUNTER — APPOINTMENT (OUTPATIENT)
Dept: COLORECTAL SURGERY | Facility: CLINIC | Age: 35
End: 2020-09-25
Payer: COMMERCIAL

## 2020-09-25 VITALS
BODY MASS INDEX: 44.86 KG/M2 | HEART RATE: 101 BPM | WEIGHT: 296 LBS | TEMPERATURE: 98.2 F | SYSTOLIC BLOOD PRESSURE: 132 MMHG | HEIGHT: 68 IN | DIASTOLIC BLOOD PRESSURE: 81 MMHG

## 2020-09-25 PROCEDURE — 45330 DIAGNOSTIC SIGMOIDOSCOPY: CPT

## 2020-09-25 PROCEDURE — 99215 OFFICE O/P EST HI 40 MIN: CPT | Mod: 25

## 2020-09-25 RX ORDER — NEOMYCIN SULFATE 500 MG/1
500 TABLET ORAL
Qty: 6 | Refills: 0 | Status: DISCONTINUED | COMMUNITY
Start: 2020-01-13 | End: 2020-09-25

## 2020-09-25 RX ORDER — AMOXICILLIN AND CLAVULANATE POTASSIUM 875; 125 MG/1; MG/1
875-125 TABLET, COATED ORAL
Qty: 10 | Refills: 0 | Status: DISCONTINUED | COMMUNITY
Start: 2020-06-25 | End: 2020-09-25

## 2020-09-25 NOTE — HISTORY OF PRESENT ILLNESS
[FreeTextEntry1] : 34 y/o M presents for f/u fistulizing Crohn's disease \par S/p sigmoid resection, small bowel resection, take down of entero-colonic fistula, repair of bladder fistula  and ileostomy creation on 6/9/20 \par On Stelara Q8 weeks, last dose taken 1 week ago\par \par C/o occasional abdominal discomfort with coughing or sneezing\par Denies N/V. Reports good appetite and energy levels\par BH:5-8 times daily, watery to paste-like stating that recently it has been more on the watery side\par Changing appliance every couple of days depending on current issues. Reports a resolving rash that required him to change appliance more frequently \par Denies irritation or bleeding when changing appliance \par Patient developed UTI after surgery that was treated by antibiotics prescribed by Dr. Scott. REports having U/A completed with PCP 2 weeks ago that was normal \par

## 2020-09-25 NOTE — PHYSICAL EXAM
[Abdomen Masses] : No abdominal masses [No HSM] : no hepatosplenomegaly [Normal] : was normal [None] : there was no rectal mass  [JVD] : no jugular venous distention  [Normal Breath Sounds] : Normal breath sounds [Normal Heart Sounds] : normal heart sounds [de-identified] : Midline incision. Well-healed. Ileostomy functional [FreeTextEntry1] : The risks , benefits and alternatives of the procedure were reviewed with the patient. The patient consents to the planned procedure.\par \par The flexible sigmoidoscope was passed through the anus into the rectum. The scope was passed to  50    cm from the anal verge.\par \par The findings revealed:\par Well-healed anastomosis at 18 cm.\par

## 2020-12-20 ENCOUNTER — LABORATORY RESULT (OUTPATIENT)
Age: 35
End: 2020-12-20

## 2020-12-20 ENCOUNTER — APPOINTMENT (OUTPATIENT)
Dept: DISASTER EMERGENCY | Facility: CLINIC | Age: 35
End: 2020-12-20

## 2020-12-21 ENCOUNTER — TRANSCRIPTION ENCOUNTER (OUTPATIENT)
Age: 35
End: 2020-12-21

## 2020-12-21 ENCOUNTER — NON-APPOINTMENT (OUTPATIENT)
Age: 35
End: 2020-12-21

## 2020-12-21 VITALS
SYSTOLIC BLOOD PRESSURE: 128 MMHG | WEIGHT: 299.83 LBS | HEIGHT: 65 IN | TEMPERATURE: 97 F | OXYGEN SATURATION: 97 % | DIASTOLIC BLOOD PRESSURE: 78 MMHG | RESPIRATION RATE: 16 BRPM | HEART RATE: 89 BPM

## 2020-12-21 RX ORDER — INFLUENZA VIRUS VACCINE 15; 15; 15; 15 UG/.5ML; UG/.5ML; UG/.5ML; UG/.5ML
0.5 SUSPENSION INTRAMUSCULAR ONCE
Refills: 0 | Status: DISCONTINUED | OUTPATIENT
Start: 2020-12-22 | End: 2020-12-24

## 2020-12-21 NOTE — PATIENT PROFILE ADULT - PATIENT REPRESENTATIVE: ( YOU CAN CHOOSE ANY PERSON THAT CAN ASSIST YOU WITH YOUR HEALTH CARE PREFERENCES, DOES NOT HAVE TO BE A SPOUSE, IMMEDIATE FAMILY OR SIGNIFICANT OTHER/PARTNER)
Received a call form workman's comp [employer]AL[employer]  Mallory [phone] requesting that pt needs to be evaluated by Mely Matthews and psych eval,  CM  Clarify why does the pt needs these eval when  pt was being seen by PT and  Inform her about the PT recs:  Patient needs nondaily skilled therapy after discharge and that pt prefers to be d/c home.  Per Mallory she said that it's a workman's comp protocol and psych eval was requested by family, KANCHAN Gaytan  And Trauma APN aware, CM will follow for safe and appropriate d/c.       RISA Marie, RN-C/CM  Care Management Department  Office:  [phone] (internal:  )  Pager:  [phone]  Fax:  [phone]   same name as above

## 2020-12-22 ENCOUNTER — RX RENEWAL (OUTPATIENT)
Age: 35
End: 2020-12-22

## 2020-12-22 ENCOUNTER — RESULT REVIEW (OUTPATIENT)
Age: 35
End: 2020-12-22

## 2020-12-22 ENCOUNTER — APPOINTMENT (OUTPATIENT)
Dept: COLORECTAL SURGERY | Facility: HOSPITAL | Age: 35
End: 2020-12-22

## 2020-12-22 ENCOUNTER — INPATIENT (INPATIENT)
Facility: HOSPITAL | Age: 35
LOS: 1 days | Discharge: HOME HEALTH | DRG: 330 | End: 2020-12-24
Attending: SURGERY | Admitting: SURGERY
Payer: COMMERCIAL

## 2020-12-22 DIAGNOSIS — Z98.890 OTHER SPECIFIED POSTPROCEDURAL STATES: Chronic | ICD-10-CM

## 2020-12-22 LAB
BLD GP AB SCN SERPL QL: NEGATIVE — SIGNIFICANT CHANGE UP
RH IG SCN BLD-IMP: POSITIVE — SIGNIFICANT CHANGE UP

## 2020-12-22 PROCEDURE — 88304 TISSUE EXAM BY PATHOLOGIST: CPT | Mod: 26

## 2020-12-22 PROCEDURE — 44625 REPAIR BOWEL OPENING: CPT | Mod: GC

## 2020-12-22 RX ORDER — ENOXAPARIN SODIUM 100 MG/ML
40 INJECTION SUBCUTANEOUS EVERY 12 HOURS
Refills: 0 | Status: DISCONTINUED | OUTPATIENT
Start: 2020-12-22 | End: 2020-12-24

## 2020-12-22 RX ORDER — SODIUM CHLORIDE 9 MG/ML
1000 INJECTION, SOLUTION INTRAVENOUS
Refills: 0 | Status: DISCONTINUED | OUTPATIENT
Start: 2020-12-22 | End: 2020-12-23

## 2020-12-22 RX ORDER — ONDANSETRON 8 MG/1
4 TABLET, FILM COATED ORAL EVERY 6 HOURS
Refills: 0 | Status: DISCONTINUED | OUTPATIENT
Start: 2020-12-22 | End: 2020-12-24

## 2020-12-22 RX ORDER — HYDROMORPHONE HYDROCHLORIDE 2 MG/ML
0.25 INJECTION INTRAMUSCULAR; INTRAVENOUS; SUBCUTANEOUS EVERY 4 HOURS
Refills: 0 | Status: DISCONTINUED | OUTPATIENT
Start: 2020-12-22 | End: 2020-12-24

## 2020-12-22 RX ORDER — GABAPENTIN 400 MG/1
600 CAPSULE ORAL ONCE
Refills: 0 | Status: COMPLETED | OUTPATIENT
Start: 2020-12-22 | End: 2020-12-22

## 2020-12-22 RX ORDER — HEPARIN SODIUM 5000 [USP'U]/ML
5000 INJECTION INTRAVENOUS; SUBCUTANEOUS ONCE
Refills: 0 | Status: COMPLETED | OUTPATIENT
Start: 2020-12-22 | End: 2020-12-22

## 2020-12-22 RX ORDER — KETOROLAC TROMETHAMINE 30 MG/ML
30 SYRINGE (ML) INJECTION EVERY 8 HOURS
Refills: 0 | Status: DISCONTINUED | OUTPATIENT
Start: 2020-12-22 | End: 2020-12-23

## 2020-12-22 RX ORDER — ACETAMINOPHEN 500 MG
1000 TABLET ORAL ONCE
Refills: 0 | Status: COMPLETED | OUTPATIENT
Start: 2020-12-22 | End: 2020-12-22

## 2020-12-22 RX ORDER — BUPIVACAINE 13.3 MG/ML
20 INJECTION, SUSPENSION, LIPOSOMAL INFILTRATION ONCE
Refills: 0 | Status: DISCONTINUED | OUTPATIENT
Start: 2020-12-22 | End: 2020-12-24

## 2020-12-22 RX ORDER — ACETAMINOPHEN 500 MG
1000 TABLET ORAL EVERY 8 HOURS
Refills: 0 | Status: COMPLETED | OUTPATIENT
Start: 2020-12-22 | End: 2020-12-23

## 2020-12-22 RX ADMIN — Medication 30 MILLIGRAM(S): at 15:29

## 2020-12-22 RX ADMIN — GABAPENTIN 600 MILLIGRAM(S): 400 CAPSULE ORAL at 07:50

## 2020-12-22 RX ADMIN — Medication 30 MILLIGRAM(S): at 23:42

## 2020-12-22 RX ADMIN — Medication 30 MILLIGRAM(S): at 15:57

## 2020-12-22 RX ADMIN — HEPARIN SODIUM 5000 UNIT(S): 5000 INJECTION INTRAVENOUS; SUBCUTANEOUS at 07:50

## 2020-12-22 RX ADMIN — Medication 1000 MILLIGRAM(S): at 15:57

## 2020-12-22 RX ADMIN — Medication 1000 MILLIGRAM(S): at 07:50

## 2020-12-22 RX ADMIN — Medication 1000 MILLIGRAM(S): at 15:29

## 2020-12-22 RX ADMIN — ENOXAPARIN SODIUM 40 MILLIGRAM(S): 100 INJECTION SUBCUTANEOUS at 17:39

## 2020-12-22 RX ADMIN — Medication 1000 MILLIGRAM(S): at 23:27

## 2020-12-22 RX ADMIN — Medication 30 MILLIGRAM(S): at 23:27

## 2020-12-22 NOTE — H&P ADULT - ASSESSMENT
34M PMHx fistulizing Crohn's disease on Stelara q 8 weeks, recently admitted 6/9/2020 - 6/16/2020 for open sigmoidectomy, small bowel resection, bladder repair and ileostomy creation, presents today for ileostomy reversal.     pain/nausea control  IS/OOB  to OR for above procedure  admit to colorectal surgery

## 2020-12-22 NOTE — H&P ADULT - NSHPPHYSICALEXAM_GEN_ALL_CORE
VSS.    GEN: NAD, resting comfortably in bed  HEENT: MMM  CV: RRR  Resp: nonlabored breathing, no respiratory distress  Abd: soft, nontender, nondistended  Ext: WWP, no edema, no calf tenderness  Neuro: no focal deficits  Psych: pleasant

## 2020-12-22 NOTE — H&P ADULT - HISTORY OF PRESENT ILLNESS
34M PMHx fistulizing Crohn's disease on Stelara q 8 weeks, recently admitted 6/9/2020 - 6/16/2020 for open sigmoidectomy, small bowel resection, bladder repair and ileostomy creation, presents today for ileostomy reversal. On 12/2018, he underwent an EGD and colonoscopy that demonstrated stricture of ascending colon vs cecal inflammatory polyps that was unable to be transversed; CT enterography (12/2018) demonstrated multiple complex fistulas involving the sigmoid colon and distal terminal ileum and an enterovesicular fistula. His admission in June was prolonged due to SBO vs ileus with slow advancement of diet. Had an UTI recently managed as an outpatient. Has been having 5-8 bowel movements daily with a mild peristomal rash.

## 2020-12-22 NOTE — BRIEF OPERATIVE NOTE - OPERATION/FINDINGS
5cm incision made around ileostomy at mucocutaneous junction. Lysis of adhesions with combination of blunt and electrosurgery dissection. Incision widened to 10cm for lysis of adhesions. Anastomosis created with endoGIA purple load stapler 60mm x 2. Common channel closed with JOSE stapler purple load. Edges of staple line clipped. Due to complex parastomal and midline hernia defect at site of prior laparotomy, Dr. Aguila consulted intraoperatively. Risks/benefits of hernia repair with underlay biologic mesh discussed. However, due to the extent of the dissection required and the consequences of a potential anastomotic leak underneath this complex hernia repair, the decision made to close fascia laterally and close the hernia sac at the midline, advise the patient to lose weight and once anastomosis heals, return for elective hernia repair. Fascia and hernia sac closed with #1 PDS+ figure of 8 sutures. 2-0 Vicryl deep dermals placed and skin closed with staples. Ileostomy site packed with Betadine gauze. Abdominal binder placed.

## 2020-12-23 LAB
ANION GAP SERPL CALC-SCNC: 9 MMOL/L — SIGNIFICANT CHANGE UP (ref 5–17)
BUN SERPL-MCNC: 12 MG/DL — SIGNIFICANT CHANGE UP (ref 7–23)
CALCIUM SERPL-MCNC: 9.1 MG/DL — SIGNIFICANT CHANGE UP (ref 8.4–10.5)
CHLORIDE SERPL-SCNC: 99 MMOL/L — SIGNIFICANT CHANGE UP (ref 96–108)
CO2 SERPL-SCNC: 30 MMOL/L — SIGNIFICANT CHANGE UP (ref 22–31)
CREAT SERPL-MCNC: 0.76 MG/DL — SIGNIFICANT CHANGE UP (ref 0.5–1.3)
GLUCOSE SERPL-MCNC: 102 MG/DL — HIGH (ref 70–99)
HCT VFR BLD CALC: 40.3 % — SIGNIFICANT CHANGE UP (ref 39–50)
HGB BLD-MCNC: 12.2 G/DL — LOW (ref 13–17)
MAGNESIUM SERPL-MCNC: 2.2 MG/DL — SIGNIFICANT CHANGE UP (ref 1.6–2.6)
MCHC RBC-ENTMCNC: 24 PG — LOW (ref 27–34)
MCHC RBC-ENTMCNC: 30.3 GM/DL — LOW (ref 32–36)
MCV RBC AUTO: 79.3 FL — LOW (ref 80–100)
NRBC # BLD: 0 /100 WBCS — SIGNIFICANT CHANGE UP (ref 0–0)
PHOSPHATE SERPL-MCNC: 4.5 MG/DL — SIGNIFICANT CHANGE UP (ref 2.5–4.5)
PLATELET # BLD AUTO: 232 K/UL — SIGNIFICANT CHANGE UP (ref 150–400)
POTASSIUM SERPL-MCNC: 4.4 MMOL/L — SIGNIFICANT CHANGE UP (ref 3.5–5.3)
POTASSIUM SERPL-SCNC: 4.4 MMOL/L — SIGNIFICANT CHANGE UP (ref 3.5–5.3)
RBC # BLD: 5.08 M/UL — SIGNIFICANT CHANGE UP (ref 4.2–5.8)
RBC # FLD: 14.6 % — HIGH (ref 10.3–14.5)
SODIUM SERPL-SCNC: 138 MMOL/L — SIGNIFICANT CHANGE UP (ref 135–145)
WBC # BLD: 9.3 K/UL — SIGNIFICANT CHANGE UP (ref 3.8–10.5)
WBC # FLD AUTO: 9.3 K/UL — SIGNIFICANT CHANGE UP (ref 3.8–10.5)

## 2020-12-23 RX ORDER — ACETAMINOPHEN 500 MG
650 TABLET ORAL EVERY 6 HOURS
Refills: 0 | Status: DISCONTINUED | OUTPATIENT
Start: 2020-12-23 | End: 2020-12-24

## 2020-12-23 RX ORDER — SODIUM CHLORIDE 9 MG/ML
1000 INJECTION, SOLUTION INTRAVENOUS
Refills: 0 | Status: DISCONTINUED | OUTPATIENT
Start: 2020-12-23 | End: 2020-12-24

## 2020-12-23 RX ADMIN — Medication 30 MILLIGRAM(S): at 07:28

## 2020-12-23 RX ADMIN — HYDROMORPHONE HYDROCHLORIDE 0.25 MILLIGRAM(S): 2 INJECTION INTRAMUSCULAR; INTRAVENOUS; SUBCUTANEOUS at 22:14

## 2020-12-23 RX ADMIN — Medication 650 MILLIGRAM(S): at 16:07

## 2020-12-23 RX ADMIN — Medication 1000 MILLIGRAM(S): at 00:27

## 2020-12-23 RX ADMIN — ENOXAPARIN SODIUM 40 MILLIGRAM(S): 100 INJECTION SUBCUTANEOUS at 17:59

## 2020-12-23 RX ADMIN — HYDROMORPHONE HYDROCHLORIDE 0.25 MILLIGRAM(S): 2 INJECTION INTRAMUSCULAR; INTRAVENOUS; SUBCUTANEOUS at 21:14

## 2020-12-23 RX ADMIN — ENOXAPARIN SODIUM 40 MILLIGRAM(S): 100 INJECTION SUBCUTANEOUS at 05:04

## 2020-12-23 RX ADMIN — Medication 650 MILLIGRAM(S): at 17:07

## 2020-12-23 RX ADMIN — Medication 1000 MILLIGRAM(S): at 07:13

## 2020-12-23 RX ADMIN — Medication 30 MILLIGRAM(S): at 07:13

## 2020-12-23 RX ADMIN — Medication 1000 MILLIGRAM(S): at 07:28

## 2020-12-24 ENCOUNTER — TRANSCRIPTION ENCOUNTER (OUTPATIENT)
Age: 35
End: 2020-12-24

## 2020-12-24 VITALS — TEMPERATURE: 99 F

## 2020-12-24 LAB
ANION GAP SERPL CALC-SCNC: 10 MMOL/L — SIGNIFICANT CHANGE UP (ref 5–17)
BUN SERPL-MCNC: 9 MG/DL — SIGNIFICANT CHANGE UP (ref 7–23)
CALCIUM SERPL-MCNC: 8.8 MG/DL — SIGNIFICANT CHANGE UP (ref 8.4–10.5)
CHLORIDE SERPL-SCNC: 98 MMOL/L — SIGNIFICANT CHANGE UP (ref 96–108)
CO2 SERPL-SCNC: 30 MMOL/L — SIGNIFICANT CHANGE UP (ref 22–31)
CREAT SERPL-MCNC: 0.79 MG/DL — SIGNIFICANT CHANGE UP (ref 0.5–1.3)
GLUCOSE SERPL-MCNC: 101 MG/DL — HIGH (ref 70–99)
HCT VFR BLD CALC: 39.7 % — SIGNIFICANT CHANGE UP (ref 39–50)
HGB BLD-MCNC: 12.1 G/DL — LOW (ref 13–17)
MAGNESIUM SERPL-MCNC: 2.2 MG/DL — SIGNIFICANT CHANGE UP (ref 1.6–2.6)
MCHC RBC-ENTMCNC: 24.2 PG — LOW (ref 27–34)
MCHC RBC-ENTMCNC: 30.5 GM/DL — LOW (ref 32–36)
MCV RBC AUTO: 79.6 FL — LOW (ref 80–100)
NRBC # BLD: 0 /100 WBCS — SIGNIFICANT CHANGE UP (ref 0–0)
PHOSPHATE SERPL-MCNC: 3.9 MG/DL — SIGNIFICANT CHANGE UP (ref 2.5–4.5)
PLATELET # BLD AUTO: 211 K/UL — SIGNIFICANT CHANGE UP (ref 150–400)
POTASSIUM SERPL-MCNC: 3.9 MMOL/L — SIGNIFICANT CHANGE UP (ref 3.5–5.3)
POTASSIUM SERPL-SCNC: 3.9 MMOL/L — SIGNIFICANT CHANGE UP (ref 3.5–5.3)
RBC # BLD: 4.99 M/UL — SIGNIFICANT CHANGE UP (ref 4.2–5.8)
RBC # FLD: 14.7 % — HIGH (ref 10.3–14.5)
SODIUM SERPL-SCNC: 138 MMOL/L — SIGNIFICANT CHANGE UP (ref 135–145)
WBC # BLD: 8.39 K/UL — SIGNIFICANT CHANGE UP (ref 3.8–10.5)
WBC # FLD AUTO: 8.39 K/UL — SIGNIFICANT CHANGE UP (ref 3.8–10.5)

## 2020-12-24 PROCEDURE — 86850 RBC ANTIBODY SCREEN: CPT

## 2020-12-24 PROCEDURE — 97161 PT EVAL LOW COMPLEX 20 MIN: CPT

## 2020-12-24 PROCEDURE — 82962 GLUCOSE BLOOD TEST: CPT

## 2020-12-24 PROCEDURE — 83735 ASSAY OF MAGNESIUM: CPT

## 2020-12-24 PROCEDURE — 80048 BASIC METABOLIC PNL TOTAL CA: CPT

## 2020-12-24 PROCEDURE — 88304 TISSUE EXAM BY PATHOLOGIST: CPT

## 2020-12-24 PROCEDURE — 85027 COMPLETE CBC AUTOMATED: CPT

## 2020-12-24 PROCEDURE — 86901 BLOOD TYPING SEROLOGIC RH(D): CPT

## 2020-12-24 PROCEDURE — C9399: CPT

## 2020-12-24 PROCEDURE — 86900 BLOOD TYPING SEROLOGIC ABO: CPT

## 2020-12-24 PROCEDURE — 36415 COLL VENOUS BLD VENIPUNCTURE: CPT

## 2020-12-24 PROCEDURE — C1889: CPT

## 2020-12-24 PROCEDURE — 84100 ASSAY OF PHOSPHORUS: CPT

## 2020-12-24 RX ORDER — ACETAMINOPHEN 500 MG
2 TABLET ORAL
Qty: 0 | Refills: 0 | DISCHARGE
Start: 2020-12-24

## 2020-12-24 RX ORDER — POTASSIUM CHLORIDE 20 MEQ
20 PACKET (EA) ORAL ONCE
Refills: 0 | Status: COMPLETED | OUTPATIENT
Start: 2020-12-24 | End: 2020-12-24

## 2020-12-24 RX ORDER — USTEKINUMAB 45 MG/.5ML
0 INJECTION, SOLUTION SUBCUTANEOUS
Qty: 0 | Refills: 0 | DISCHARGE

## 2020-12-24 RX ADMIN — ENOXAPARIN SODIUM 40 MILLIGRAM(S): 100 INJECTION SUBCUTANEOUS at 05:25

## 2020-12-24 RX ADMIN — Medication 20 MILLIEQUIVALENT(S): at 07:51

## 2020-12-24 NOTE — DISCHARGE NOTE NURSING/CASE MANAGEMENT/SOCIAL WORK - PATIENT PORTAL LINK FT
You can access the FollowMyHealth Patient Portal offered by [facility] by registering at the following website: [email or web address]. By joining [employer]’s FollowMyHealth portal, you will also be able to view your health information using other applications (apps) compatible with our system.

## 2020-12-24 NOTE — DISCHARGE NOTE PROVIDER - NSDCFUADDINST_GEN_ALL_CORE_FT
Wound Care: Wet to dry packing with wet gauze in the wound with dry gauze on top Drew Moreno,    Thank you for your visit to [facility]. After your discharge, it is important that you follow up in the clinic with Dr. Booker. Please call the office to schedule an appointment for 1-2 weeks from now.    Wound Care: Wet to dry packing with wet gauze in the wound with dry gauze on top. Dressings should be changed daily.    For pain control, please use Tylenol and ibuprofen as needed.    If you experience any new symptoms of abdominal pain, nausea, vomiting, inability to pass gas or stool, please return to the ED or call 911.

## 2020-12-24 NOTE — DISCHARGE NOTE PROVIDER - NSDCCPCAREPLAN_GEN_ALL_CORE_FT
PRINCIPAL DISCHARGE DIAGNOSIS  Diagnosis: History of ileostomy  Assessment and Plan of Treatment:

## 2020-12-24 NOTE — DISCHARGE NOTE PROVIDER - HOSPITAL COURSE
Mr. Russell is a 34M PMHx fistulizing Crohn's disease on Stelara q 8 weeks, recently admitted 6/9/2020 - 6/16/2020 for open sigmoidectomy, small bowel resection, bladder repair and ileostomy creation, presenting to [facility] for ileostomy reversal on 12/22/2020 with Dr. Booker. His procedure was uncomplicated and he was extubated in the PACU without any concerns. Postoperatively, Mr. Russell did well. He had full return of bowel function on POD2, was tolerating a regular low fiber diet, voiding on his own, was up and ambulating, and had excellent pain control. He was evaluated for discharge on POD2 and did well. He was set up with home visiting nursing services for wound care and was given instructions and teaching on how to change his wound dressings. He was given instructions for follow up in 1-2 weeks in the office with Dr. Booker as well as given return precautions. He understands all of this and was discharged in clinically stable condition.

## 2020-12-24 NOTE — PROGRESS NOTE ADULT - SUBJECTIVE AND OBJECTIVE BOX
Procedure: Ileostomy reversal with OSITO  Surgeon: Ade    S: Pt has no complaints. Denies CP, SOB, CURRAN, calf tenderness. Pain controlled with medication.    O:  T(C): 35.8 (12-22-20 @ 13:33), Max: 37.2 (12-22-20 @ 11:50)  T(F): 96.5 (12-22-20 @ 13:33), Max: 98.9 (12-22-20 @ 11:50)  HR: 88 (12-22-20 @ 14:03) (84 - 99)  BP: 128/58 (12-22-20 @ 14:03) (114/56 - 137/60)  RR: 21 (12-22-20 @ 14:03) (16 - 24)  SpO2: 96% (12-22-20 @ 14:03) (93% - 99%)  Wt(kg): --            Gen: NAD, resting comfortably in bed  C/V: NSR  Pulm: Nonlabored breathing, no respiratory distress  Abd: soft, NT/ND Incision: dressings over incision soaked with Betadine.   Extrem: WWP, no calf edema, SCDs in place      A/P: 35yMale s/p Ileostomy reversal with OSITO  Diet: CLD  IVF: LR@40  Pain/nausea control  DVT ppx: SCDs, LVX  [place] 
SUBJECTIVE: Pt seen and examined by chief resident. Patient no acute events overnight, able to get adequate sleep, has not ambulated out of bed, and has not had return of bowel function. Patient denies fever, chills, chest pain, calf pain or shortness of breathe.       MEDICATIONS  (STANDING):  BUpivacaine liposome 1.3% Injectable (no eMAR) 20 milliLiter(s) Local Injection once  enoxaparin Injectable 40 milliGRAM(s) SubCutaneous every 12 hours  influenza   Vaccine 0.5 milliLiter(s) IntraMuscular once  lactated ringers. 1000 milliLiter(s) (40 mL/Hr) IV Continuous <Continuous>    MEDICATIONS  (PRN):  HYDROmorphone  Injectable 0.25 milliGRAM(s) IV Push every 4 hours PRN Moderate Pain (4 - 6)  ondansetron Injectable 4 milliGRAM(s) IV Push every 6 hours PRN Nausea      Vital Signs Last 24 Hrs  T(C): 36.9 (23 Dec 2020 04:51), Max: 37.2 (22 Dec 2020 11:50)  T(F): 98.4 (23 Dec 2020 04:51), Max: 98.9 (22 Dec 2020 11:50)  HR: 78 (23 Dec 2020 04:19) (78 - 99)  BP: 113/53 (23 Dec 2020 04:19) (102/56 - 137/60)  BP(mean): 77 (23 Dec 2020 04:19) (73 - 87)  RR: 18 (23 Dec 2020 04:19) (16 - 24)  SpO2: 95% (23 Dec 2020 04:19) (93% - 99%)    Physical Exam:  GENERAL: NAD, Resting comfortably in bed  RESP: Nonlabored breathing, No respiratory distress  CARD: Normal rate, Normal peripheral perfusion  GI: Obese, soft, NT, ND, no guarding, no rebound tenderness, incisions are clean/dry without drainage/intact, no hematoma  EXTREM: WWP, No edema, SCDs in place    I&O's Summary    22 Dec 2020 07:01  -  23 Dec 2020 07:00  --------------------------------------------------------  IN: 720 mL / OUT: 955 mL / NET: -235 mL        LABS:                        12.2   9.30  )-----------( 232      ( 23 Dec 2020 05:59 )             40.3     12-23    138  |  99  |  12  ----------------------------<  102<H>  4.4   |  30  |  0.76    Ca    9.1      23 Dec 2020 05:59  Phos  4.5     12-23  Mg     2.2     12-23          CAPILLARY BLOOD GLUCOSE              
SUBJECTIVE: Pt seen and examined by chief resident. Patient reports pain is controlled, tolerating LFD diet without nausea or vomiting, and having flatus and bowel movements, ambulating out of bed to the bathroom/hallway.   Patient denies fever, chills, chest pain, calf pain or shortness of breathe.       MEDICATIONS  (STANDING):  BUpivacaine liposome 1.3% Injectable (no eMAR) 20 milliLiter(s) Local Injection once  enoxaparin Injectable 40 milliGRAM(s) SubCutaneous every 12 hours  influenza   Vaccine 0.5 milliLiter(s) IntraMuscular once  lactated ringers. 1000 milliLiter(s) (40 mL/Hr) IV Continuous <Continuous>  potassium chloride   Powder 20 milliEquivalent(s) Oral once    MEDICATIONS  (PRN):  acetaminophen   Tablet .. 650 milliGRAM(s) Oral every 6 hours PRN Mild Pain (1 - 3)  HYDROmorphone  Injectable 0.25 milliGRAM(s) IV Push every 4 hours PRN Moderate Pain (4 - 6)  ondansetron Injectable 4 milliGRAM(s) IV Push every 6 hours PRN Nausea      Vital Signs Last 24 Hrs  T(C): 36.6 (24 Dec 2020 04:51), Max: 37.5 (23 Dec 2020 22:14)  T(F): 97.8 (24 Dec 2020 04:51), Max: 99.5 (23 Dec 2020 22:14)  HR: 98 (24 Dec 2020 04:36) (80 - 98)  BP: 131/68 (24 Dec 2020 04:36) (114/53 - 131/68)  BP(mean): 91 (24 Dec 2020 04:36) (77 - 91)  RR: 18 (24 Dec 2020 04:36) (17 - 18)  SpO2: 95% (24 Dec 2020 04:36) (94% - 98%)    Physical Exam:  GENERAL: NAD, Resting comfortably in bed  RESP: Nonlabored breathing, No respiratory distress  CARD: Normal rate, Normal peripheral perfusion  GI: Obese, soft, NT, ND, no guarding, no rebound tenderness, incisions are clean/dry without drainage/intact, no hematoma, packing changed on rounds, ileostomy reversal site is patent, viable with no discharge, ecchymosis noted  EXTREM: WWP, No edema, SCDs in place    I&O's Summary    23 Dec 2020 07:01  -  24 Dec 2020 07:00  --------------------------------------------------------  IN: 920 mL / OUT: 1800 mL / NET: -880 mL        LABS:                        12.1   8.39  )-----------( 211      ( 24 Dec 2020 06:01 )             39.7     12-24    138  |  98  |  9   ----------------------------<  101<H>  3.9   |  30  |  0.79    Ca    8.8      24 Dec 2020 06:01  Phos  3.9     12-24  Mg     2.2     12-24          CAPILLARY BLOOD GLUCOSE

## 2020-12-24 NOTE — PHYSICAL THERAPY INITIAL EVALUATION ADULT - DISCHARGE DISPOSITION, PT EVAL
pt demonstrates independence with all basic mobility and Is cleared by PT, safe for d/c home at this time. FIM 7/home/no skilled PT needs

## 2020-12-24 NOTE — DISCHARGE NOTE PROVIDER - NSDCCPTREATMENT_GEN_ALL_CORE_FT
PRINCIPAL PROCEDURE  Procedure: Complex reversal of ileostomy  Findings and Treatment:       SECONDARY PROCEDURE  Procedure: Complex reversal of ileostomy  Findings and Treatment:

## 2020-12-24 NOTE — PHYSICAL THERAPY INITIAL EVALUATION ADULT - GENERAL OBSERVATIONS, REHAB EVAL
Pt received standing independently in room +tele, cleared for PT by ARELIS Valentine, agreeable to PT Eval. Left semi-supine +call macho, ARELIS Valentine aware. FIM 7

## 2020-12-24 NOTE — PROGRESS NOTE ADULT - ASSESSMENT
34M PMHx fistulizing Crohn's disease on Stelara q 8 weeks, recently admitted 6/9/2020 - 6/16/2020 for open sigmoidectomy, small bowel resection, bladder repair and ileostomy creation, presents for ileostomy reversal (12/22) POD 1    Pain/Nausea Control   CLD/LR @ 40cc  IS/OOB  [place]  SQL/SCDs   ERAS protocol  Nirali Leon the whole time  
34M PMHx fistulizing Crohn's disease on Stelara q 8 weeks, recently admitted 6/9/2020 - 6/16/2020 for open sigmoidectomy, small bowel resection, bladder repair and ileostomy creation, presents for ileostomy reversal (12/22) POD 2    Pain/Nausea Control   LFD/LR @ 40cc  IS/OOB  Voiding  SQL/SCDs   ERAS protocol  Abd Edward the whole time

## 2020-12-24 NOTE — DISCHARGE NOTE PROVIDER - NSDCHHCONTACT_GEN_ALL_CORE_FT
History of Present Illness


H&P Date: 06/14/19


71-year-old male who presents with complaints of shortness of breath and going 

on progressively over last he did recently drive from [place] to [place] he 

does say he's been having worsening shortness of breath some orthopnea and 

exertional dyspnea.  No fevers chills nausea vomiting sweats no overt chest 

pain.  He has a history of heart failure.  He does have some peripheral edema 

which she states is not as bad as it has been in the past.  He has a cough some 

clear phlegm and per his wife he's been less active for last week or 2 then 

usual.


Workup in ED was significant for mildly elevated troponin and elevated d-dimer; 

CT chest could not be done because of elevated creatinine; patient underwent VQ 

scanning which was low probability for PE; patient was started on IV heparin for

elevated troponin along with aspirin and beta blockers; cardiology is consulted








Review of Systems


Constitutional: Denies chills, Denies fever


Eyes: denies blurred vision


Ears, nose, mouth and throat: Denies epistaxis, Denies hoarseness


Cardiovascular: Reports dyspnea on exertion, Reports edema


Respiratory: Reports cough with sputum


Gastrointestinal: Denies abdominal pain, Denies nausea, Denies vomiting


Genitourinary: Denies dysuria, Denies hematuria


Integumentary: Denies color changes, Denies rash


Neurological: Denies confusion, Denies double vision, Denies gait dysfunction


Psychiatric: Denies anxiety, Denies confusion


Endocrine: Denies cold intolerance, Denies heat intolerance


Hematologic/Lymphatic: Denies easy bruising, Denies lymphadenopathy


Allergic/Immunologic: Denies anaphylaxis, Denies seasonal allergies





Past Medical History


Past Medical History: Coronary Artery Disease (CAD), Cancer, Vascular Disorder


Additional Past Medical History / Comment(s): bladder cancer, carotid stenosis


History of Any Multi-Drug Resistant Organisms: None Reported


Past Surgical History: Appendectomy, Back Surgery, Coronary Bypass/CABG, Heart 

Catheterization, Heart Catheterization With Stent, Pacemaker


Past Psychological History: No Psychological Hx Reported


Smoking Status: Former smoker


Past Alcohol Use History: None Reported


Past Drug Use History: None Reported





- Past Family History


  ** Father


Family Medical History: CVA/TIA





  ** Mother


Family Medical History: Myocardial Infarction (MI)





Medications and Allergies


                                Home Medications











 Medication  Instructions  Recorded  Confirmed  Type


 


Allopurinol [Zyloprim] 300 mg PO DAILY 06/14/19 06/14/19 History


 


Aspirin [[place] Aspirin EC] 81 mg PO DAILY 06/14/19 06/14/19 History


 


Atorvastatin [Lipitor] 40 mg PO DAILY 06/14/19 06/14/19 History


 


Bumetanide [BUMEX] 1 mg PO DAILY 06/14/19 06/14/19 History


 


Carvedilol [Coreg] 3.125 mg PO BID 06/14/19 06/14/19 History


 


Clopidogrel [Plavix] 75 mg PO DAILY 06/14/19 06/14/19 History


 


Lisinopril [Zestril] 10 mg PO DAILY 06/14/19 06/14/19 History


 


oxyCODONE HCL/ACETAMINOPHEN 1 tab PO Q4HR PRN 06/14/19 06/14/19 History





[Percocet 5-325 mg]    








                                    Allergies











Allergy/AdvReac Type Severity Reaction Status Date / Time


 


No Known Allergies Allergy   Verified 06/14/19 12:07














Physical Exam


Vitals: 


                                   Vital Signs











  Temp Pulse Resp BP Pulse Ox


 


 06/14/19 16:57     90/67 


 


 06/14/19 16:33   112 H  20  90/66  98


 


 06/14/19 15:43   111 H  20  90/55  92 L


 


 06/14/19 15:32   99  20  103/64  95


 


 06/14/19 14:40   112 H  18  101/68  99


 


 06/14/19 14:14   112 H  18  96/69  100


 


 06/14/19 13:17   114 H  18  110/83  100


 


 06/14/19 12:28   114 H   


 


 06/14/19 12:18   114 H   


 


 06/14/19 11:49  97.7 F  115 H  24  111/71  100








                                Intake and Output











 06/14/19 06/14/19 06/14/19





 06:59 14:59 22:59


 


Other:   


 


  Weight  104.326 kg 











Limitations: no limitations


General appearance: alert, in no apparent distress


Head exam: Present: atraumatic, normocephalic, normal inspection


Eye exam: Present: normal appearance, PERRL, EOMI.  Absent: scleral icterus, 

conjunctival injection, periorbital swelling


ENT exam: Present: normal exam, mucous membranes moist


Neck exam: Present: normal inspection, full ROM, other (No stridor JVD or 

bruits).  Absent: tenderness, meningismus, lymphadenopathy


Respiratory exam: Present: normal lung sounds bilaterally.  Absent: respiratory 

distress, wheezes, rales, rhonchi, stridor


Cardiovascular Exam: Present: regular rate, normal rhythm, normal heart sounds. 

Absent: systolic murmur, diastolic murmur, rubs, gallop, clicks


GI/Abdominal exam: Present: soft, normal bowel sounds.  Absent: distended, 

tenderness, guarding, rebound, rigid


Rectal exam: Present: deferred


Extremities exam: Present: full ROM, normal capillary refill, pedal edema.  

Absent: tenderness, joint swelling, calf tenderness


Back exam: Present: normal inspection


Neurological exam: Present: alert, oriented X3, CN II-XII intact


Psychiatric exam: Present: normal affect, normal mood


Skin exam: Present: warm, dry, intact, normal color.  Absent: rash








Results


CBC & Chem 7: 


                                 06/15/19 06:21





                                 06/14/19 12:18


Labs: 


                  Abnormal Lab Results - Last 24 Hours (Table)











  06/14/19 06/14/19 06/14/19 Range/Units





  12:18 12:18 12:18 


 


MCV  104.5 H    (80.0-100.0)  fL


 


RDW  18.0 H    (11.5-15.5)  %


 


Plt Count  112 L    (150-450)  k/uL


 


Lymphocytes #  0.9 L    (1.0-4.8)  k/uL


 


PT    16.6 H  (9.0-12.0)  sec


 


INR    1.7 H  (<1.2)  


 


D-Dimer    4.84 H  (<0.60)  mg/L FEU


 


BUN   40 H   (9-20)  mg/dL


 


Creatinine   1.63 H   (0.66-1.25)  mg/dL


 


Total Bilirubin   3.8 H   (0.2-1.3)  mg/dL


 


AST   60 H   (17-59)  U/L


 


Alkaline Phosphatase   140 H   ()  U/L


 


Troponin I     (0.000-0.034)  ng/mL














  06/14/19 Range/Units





  12:18 


 


MCV   (80.0-100.0)  fL


 


RDW   (11.5-15.5)  %


 


Plt Count   (150-450)  k/uL


 


Lymphocytes #   (1.0-4.8)  k/uL


 


PT   (9.0-12.0)  sec


 


INR   (<1.2)  


 


D-Dimer   (<0.60)  mg/L FEU


 


BUN   (9-20)  mg/dL


 


Creatinine   (0.66-1.25)  mg/dL


 


Total Bilirubin   (0.2-1.3)  mg/dL


 


AST   (17-59)  U/L


 


Alkaline Phosphatase   ()  U/L


 


Troponin I  0.163 H*  (0.000-0.034)  ng/mL














Assessment and Plan


Assessment: 


1.  Acute onset dyspnea; right middle lobe pneumonia


- We will start patient on IV ceftriaxone along with oral azithromycin


- Bronchodilator nebulizer treatments with Proventil/Atrovent


- Sputum and blood cultures


- Consult pulmonary for further recommendations





2.  Elevated d-dimer; rule out PE


- CT chest PE protocol could not be done due to elevated creatinine; patient had

VQ scan done which was low probability for PE


- Pulmonary service consulted for further recommendations





3.  Non-ST elevation MI


- Patient started on aspirin, beta blockers and IV heparin per protocol


- Consult cardiology for further recommendations; 2-D echo





4.  Acute exacerbation CHF


- Patient takes Bumex 1 mg by mouth daily at home; we will hold latent start 

patient on IV Lasix 40 mg every 12 hours


- We will monitor strict KATHLEEN's, daily weights, renal function and electrolytes; 

restricted fluid and sodium diet


- Echocardiogram ordered; cardiology is to see for further recommendations





5.  Hypertension; patient takes Coreg and lisinopril at home; we will hold off 

on lisinopril secondary to acute renal injury





6.  Hyperlipidemia; atorvastatin 40 mg by mouth daily at bedtime





7.  Coronary artery disease/CHF; continue with aspirin, Coreg and Plavix; we'll 

hold off on Bumex while patient on IV Lasix





8.  Acute renal injury; we will hold off on lisinopril; avoid hypotension and 

nephrotoxins 


- Monitor strict KATHLEEN's and daily weights along with renal function and 

electrolyte monitoring 


- No IV fluids secondary to exacerbation of CHF 


- We will consult nephrology if renal function continues to deteriorate 





9.  DVT prophylaxis; systemic anticoagulation with heparin





CODE STATUS; DO NOT RESUSCITATE


Time with Patient: Greater than 30
As certified below, I, or a nurse practitioner or physician assistant working with me, had a face-to-face encounter that meets the physician face-to-face encounter requirements.

## 2020-12-24 NOTE — PHYSICAL THERAPY INITIAL EVALUATION ADULT - PERTINENT HX OF CURRENT PROBLEM, REHAB EVAL
34M PMHx fistulizing Crohn's disease on Stelara q 8 weeks, recently admitted 6/9/2020 - 6/16/2020 for open sigmoidectomy, small bowel resection, bladder repair and ileostomy creation, presents today for ileostomy reversal.

## 2020-12-24 NOTE — DISCHARGE NOTE PROVIDER - CARE PROVIDER_API CALL
German Booker  COLON/RECTAL SURGERY  [address], [address] [place] [address]  Phone: [phone]  Fax: [phone]  Follow Up Time: 2 weeks

## 2020-12-24 NOTE — DISCHARGE NOTE PROVIDER - NSDCMRMEDTOKEN_GEN_ALL_CORE_FT
Stelara: every 2 months   acetaminophen 325 mg oral tablet: 2 tab(s) orally every 6 hours, As needed, Mild Pain (1 - 3)  Stelara: every 2 months per GI specialist

## 2020-12-28 LAB — SURGICAL PATHOLOGY STUDY: SIGNIFICANT CHANGE UP

## 2020-12-29 DIAGNOSIS — K50.90 CROHN'S DISEASE, UNSPECIFIED, WITHOUT COMPLICATIONS: ICD-10-CM

## 2020-12-29 DIAGNOSIS — Z43.2 ENCOUNTER FOR ATTENTION TO ILEOSTOMY: ICD-10-CM

## 2020-12-29 DIAGNOSIS — K43.2 INCISIONAL HERNIA WITHOUT OBSTRUCTION OR GANGRENE: ICD-10-CM

## 2021-01-04 ENCOUNTER — APPOINTMENT (OUTPATIENT)
Dept: COLORECTAL SURGERY | Facility: CLINIC | Age: 36
End: 2021-01-04
Payer: COMMERCIAL

## 2021-01-04 VITALS
WEIGHT: 298 LBS | HEART RATE: 97 BPM | TEMPERATURE: 97 F | DIASTOLIC BLOOD PRESSURE: 84 MMHG | SYSTOLIC BLOOD PRESSURE: 131 MMHG | BODY MASS INDEX: 45.16 KG/M2 | HEIGHT: 68 IN

## 2021-01-04 PROCEDURE — 99024 POSTOP FOLLOW-UP VISIT: CPT

## 2021-01-04 NOTE — HISTORY OF PRESENT ILLNESS
[FreeTextEntry1] : 34 y/o M presents for f/u fistulizing Crohn's disease \par S/p sigmoid resection, small bowel resection, take down of entero-colonic fistula, repair of bladder fistula and ileostomy creation on 6/9/20 now s/p ileostomy closure\par \par Ileostomy, excision:\par - Ileostomy with chronic inflammation and fibrosis of\par mucocutaneous\par junction\par \par On Stelara Q8 weeks. Last dosage was 6 weeks ago \par \par Denies abdominal pain but reports minor irritation when over extending himself physically. Initially used Tylenol PRN but no longer needs to use\par Reports incisions are healing well but the past two days has noticed a slight increase in drainage from surgical site. Denies fever, chills, or odor\par Reports adequate appetite and energy. Has begun working dietary fiber intake\par BH:3-6 times daily. Stools are more formed fluctuating between formed and paste-like \par C/o occasional FU several times per week. Denies FI \par Not using Colace

## 2021-01-04 NOTE — PHYSICAL EXAM
[No HSM] : no hepatosplenomegaly [Abdomen Masses] : No abdominal masses [Abdomen Tenderness] : ~T No ~M abdominal tenderness [de-identified] : Ileostomy site incision with draining sinus at the mid portion. Wound opened and seroma evacuated. Penrose was placed

## 2021-01-04 NOTE — ASSESSMENT
[FreeTextEntry1] : Local wound care \par \par Follow up with GI for continued Crohn's treatment.\par \par

## 2021-01-29 NOTE — HISTORY OF PRESENT ILLNESS
[FreeTextEntry1] : 36 y/o M presents for f/u fistulizing Crohn's disease \par S/p sigmoid resection, small bowel resection, take down of entero-colonic fistula, repair of bladder fistula and ileostomy creation on 6/9/20 now s/p ileostomy closure\par On Stelara Q8 weeks. Last dosage \par \par Last seen in the office on 1/4/21. Ileostomy site incision was noted to have a draining sinus at the mid portion. Penrose drain was placed\par \par \par BH:\par

## 2021-02-01 ENCOUNTER — APPOINTMENT (OUTPATIENT)
Dept: COLORECTAL SURGERY | Facility: CLINIC | Age: 36
End: 2021-02-01
Payer: COMMERCIAL

## 2021-02-04 ENCOUNTER — APPOINTMENT (OUTPATIENT)
Dept: COLORECTAL SURGERY | Facility: CLINIC | Age: 36
End: 2021-02-04
Payer: COMMERCIAL

## 2021-02-04 VITALS
HEART RATE: 91 BPM | WEIGHT: 300 LBS | TEMPERATURE: 97.7 F | DIASTOLIC BLOOD PRESSURE: 86 MMHG | HEIGHT: 68 IN | SYSTOLIC BLOOD PRESSURE: 146 MMHG | BODY MASS INDEX: 45.47 KG/M2

## 2021-02-04 PROCEDURE — 99024 POSTOP FOLLOW-UP VISIT: CPT

## 2021-02-04 NOTE — ASSESSMENT
[FreeTextEntry1] : 34 yo M with history of fistulizing Crohn's disease, requiring sigmoid and small bowel resection with diverting loop ileostomy closed on 12/22/20. Seroma noted at ileostomy site and managed with penrose drain placement. Patient recovering well, with minimal drainage from wound, penrose removed at bedside.\par \par - continue to follow up with GI, on [facility] for Crohn's\par - concern for incisional hernia; defer repair at this time, would consider elective repair with component separation or mesh after pandemic improves, and with optimization of patient's weight\par \par May return to the office as needed, no scheduled follow up indicated.

## 2021-02-04 NOTE — HISTORY OF PRESENT ILLNESS
[FreeTextEntry1] : 34 y/o M presents for f/u fistulizing Crohn's disease \par \par S/p sigmoid resection, small bowel resection, take down of entero-colonic fistula, repair of bladder fistula and ileostomy creation on 6/9/20, s/p ileostomy closure 12/22/20.\par \par Last seen in the office on 1/4/21. Ileostomy site incision with a draining sinus at the mid portion observed on exam. The wound was opened and a seroma was evacuated. Penrose drain was placed.\par \par On Stelara Q8 weeks. Last dosage due now\par \par Normal bowel function, tolerating diet, no abdominal pain. Denies fevers, chills. Some persistent drainage (serous) from ostomy site. Patient reports showering a few times per week, but not daily.

## 2021-03-10 ENCOUNTER — APPOINTMENT (OUTPATIENT)
Dept: GASTROENTEROLOGY | Facility: CLINIC | Age: 36
End: 2021-03-10
Payer: COMMERCIAL

## 2021-03-10 PROCEDURE — 99442: CPT

## 2021-03-12 NOTE — PHYSICAL EXAM
[General Appearance - Alert] : alert [General Appearance - In No Acute Distress] : in no acute distress [General Appearance - Well Nourished] : well nourished [General Appearance - Well Developed] : well developed [General Appearance - Well-Appearing] : healthy appearing [] : normal voice and communication [Oriented To Time, Place, And Person] : oriented to person, place, and time [Affect] : the affect was normal [Mood] : the mood was normal [FreeTextEntry1] : morbidly obese

## 2021-03-12 NOTE — HISTORY OF PRESENT ILLNESS
[Home] : at home, [unfilled] , at the time of the visit. 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  S/P exp lap and sigmoid bowel resection with ileostomy creation and takedown of entero-colonic fistula, repair of bladder on 6/9/20, ileostomy closure 12/22/20. Here today feeling well, with some gas complaints the last several days. \par \par Patient reports he is healing well, wound is 99% closed. Feels back to normal with normal BMs, but is c/o gas. No blood or abdominal pain. No nausea or vomiting. No fevers or chills. \par \par PATH 12/22/20: \par Ileostomy, excision:\par - Ileostomy with chronic inflammation and fibrosis of\par mucocutaneous\par junction\par \par CTE 9/10/19: stable colo-enteric, interloop and enterovesicular fistula. Mild fibrous stenotic disease of distal ileum. No new disease or acute findings. \par \par Saw Dr. Booker since last visit reporting good wound healing. \par He was compliant with his Stelara dosing and denies any ADRs to medication.\par \par Of note, CT scan revealed ground-glass nodule in left lower lobe and saw Dr. Kennedy who has advised continued monitoring. \par \par No EIMs. \par \par IBD  history: \par \par Denies EIM. No smoking. \par + anal fissures\par + psoriasis \par \par CTE with matted small and large bowel loops and multiple fistulae, scarring\par Colonoscopy with evidence of scarring, pseudopolyps, fistula opening seen. Strictured AC, cecal inflammatory polyps. Unable to advance to TI due to scarring \par EGD with gastric erythema, path (-) for HP\par \par

## 2021-03-12 NOTE — ASSESSMENT
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 Developed Ab to ADA in the context of intermittent dosing and poor compliance. Now on Stelara however had drug holiday due to surgery. S/P exp lap and sigmoid bowel resection with ileostomy creation and takedown of entero-colonic fistula, repair of bladder on 6/9/20, and the ileostomy reversal 12/22/20. Recovering well, telephone visit today to restart Ustekinumab. \par \par # advanced stricturing/ fistulizing ileocolonic CD - s/p surgery \par - restart Stelara - given his aggressive phenotype, patient will require a re-load of Stelara after his drug holiday (last dose ~ October 2020) then restart Stelara Q 8 weeks - may need to inc again back to Q 4 weeks in future if disease comes back, therefore loading him will help prevent disease progression\par - plan for cscope after 2 maintenance doses of Stelara (~ 4 months) \par - will need to discuss surveillance cscope at f/u given colonic involvement and diagnosed 10 years ago \par \par # Enterovesicular fistula - surgically removed \par \par # Ground glass nodule in lung\par - cont f/u Dr. Kennedy for monitoring \par \par # HCM - will need focused HCM visit \par - will need Vit D, B12 and iron panel \par - derm visit emphasized\par - immune to Hep B\par - hep C negative \par - obesity: consider referral to endocrine for medical weight loss \par - denies tobacco use or NSAID use\par - DEXA to be discussed at next visit \par \par F/U 4 weeks after Stelara infusion \par Giulia Almendarez NP

## 2021-03-17 ENCOUNTER — APPOINTMENT (OUTPATIENT)
Age: 36
End: 2021-03-17

## 2021-03-17 ENCOUNTER — OUTPATIENT (OUTPATIENT)
Dept: OUTPATIENT SERVICES | Facility: HOSPITAL | Age: 36
LOS: 1 days | End: 2021-03-17
Payer: COMMERCIAL

## 2021-03-17 VITALS
TEMPERATURE: 98 F | RESPIRATION RATE: 18 BRPM | OXYGEN SATURATION: 97 % | SYSTOLIC BLOOD PRESSURE: 120 MMHG | DIASTOLIC BLOOD PRESSURE: 76 MMHG | HEART RATE: 88 BPM

## 2021-03-17 VITALS
TEMPERATURE: 98 F | OXYGEN SATURATION: 95 % | DIASTOLIC BLOOD PRESSURE: 72 MMHG | HEART RATE: 83 BPM | RESPIRATION RATE: 18 BRPM | SYSTOLIC BLOOD PRESSURE: 113 MMHG | HEIGHT: 66 IN | WEIGHT: 270.07 LBS

## 2021-03-17 DIAGNOSIS — Z98.890 OTHER SPECIFIED POSTPROCEDURAL STATES: Chronic | ICD-10-CM

## 2021-03-17 DIAGNOSIS — K50.90 CROHN'S DISEASE, UNSPECIFIED, WITHOUT COMPLICATIONS: ICD-10-CM

## 2021-03-17 LAB
ALBUMIN SERPL ELPH-MCNC: 4.4 G/DL — SIGNIFICANT CHANGE UP (ref 3.3–5)
ALP SERPL-CCNC: 105 U/L — SIGNIFICANT CHANGE UP (ref 40–120)
ALT FLD-CCNC: 19 U/L — SIGNIFICANT CHANGE UP (ref 10–45)
AST SERPL-CCNC: 18 U/L — SIGNIFICANT CHANGE UP (ref 10–40)
BILIRUB DIRECT SERPL-MCNC: 0.2 MG/DL — SIGNIFICANT CHANGE UP (ref 0–0.2)
BILIRUB INDIRECT FLD-MCNC: 0.1 MG/DL — LOW (ref 0.2–1)
BILIRUB SERPL-MCNC: 0.3 MG/DL — SIGNIFICANT CHANGE UP (ref 0.2–1.2)
CRP SERPL-MCNC: 37.6 MG/L — HIGH (ref 0–4)
HCT VFR BLD CALC: 42.7 % — SIGNIFICANT CHANGE UP (ref 39–50)
HGB BLD-MCNC: 12.9 G/DL — LOW (ref 13–17)
MCHC RBC-ENTMCNC: 22.8 PG — LOW (ref 27–34)
MCHC RBC-ENTMCNC: 30.2 GM/DL — LOW (ref 32–36)
MCV RBC AUTO: 75.6 FL — LOW (ref 80–100)
NRBC # BLD: 0 /100 WBCS — SIGNIFICANT CHANGE UP (ref 0–0)
PLATELET # BLD AUTO: 256 K/UL — SIGNIFICANT CHANGE UP (ref 150–400)
PROT SERPL-MCNC: 7.2 G/DL — SIGNIFICANT CHANGE UP (ref 6–8.3)
RBC # BLD: 5.65 M/UL — SIGNIFICANT CHANGE UP (ref 4.2–5.8)
RBC # FLD: 15 % — HIGH (ref 10.3–14.5)
SARS-COV-2 RNA SPEC QL NAA+PROBE: NEGATIVE — SIGNIFICANT CHANGE UP
WBC # BLD: 6.02 K/UL — SIGNIFICANT CHANGE UP (ref 3.8–10.5)
WBC # FLD AUTO: 6.02 K/UL — SIGNIFICANT CHANGE UP (ref 3.8–10.5)

## 2021-03-17 PROCEDURE — U0005: CPT

## 2021-03-17 PROCEDURE — U0003: CPT

## 2021-03-17 PROCEDURE — 96413 CHEMO IV INFUSION 1 HR: CPT

## 2021-03-17 PROCEDURE — 36415 COLL VENOUS BLD VENIPUNCTURE: CPT

## 2021-03-17 PROCEDURE — 86140 C-REACTIVE PROTEIN: CPT

## 2021-03-17 PROCEDURE — 85027 COMPLETE CBC AUTOMATED: CPT

## 2021-03-17 PROCEDURE — 80076 HEPATIC FUNCTION PANEL: CPT

## 2021-03-17 RX ORDER — USTEKINUMAB 45 MG/.5ML
520 INJECTION, SOLUTION SUBCUTANEOUS ONCE
Refills: 0 | Status: DISCONTINUED | OUTPATIENT
Start: 2021-03-17 | End: 2021-03-17

## 2021-03-17 RX ORDER — USTEKINUMAB 45 MG/.5ML
520 INJECTION, SOLUTION SUBCUTANEOUS ONCE
Refills: 0 | Status: COMPLETED | OUTPATIENT
Start: 2021-03-17 | End: 2021-03-17

## 2021-03-17 RX ADMIN — USTEKINUMAB 520 MILLIGRAM(S): 45 INJECTION, SOLUTION SUBCUTANEOUS at 15:30

## 2021-03-17 RX ADMIN — USTEKINUMAB 250 MILLIGRAM(S): 45 INJECTION, SOLUTION SUBCUTANEOUS at 14:29

## 2021-04-14 ENCOUNTER — APPOINTMENT (OUTPATIENT)
Dept: GASTROENTEROLOGY | Facility: CLINIC | Age: 36
End: 2021-04-14
Payer: COMMERCIAL

## 2021-04-14 VITALS
HEIGHT: 68 IN | WEIGHT: 310 LBS | HEART RATE: 90 BPM | TEMPERATURE: 96.3 F | SYSTOLIC BLOOD PRESSURE: 140 MMHG | DIASTOLIC BLOOD PRESSURE: 90 MMHG | OXYGEN SATURATION: 97 % | BODY MASS INDEX: 46.98 KG/M2 | RESPIRATION RATE: 16 BRPM

## 2021-04-14 PROCEDURE — 99072 ADDL SUPL MATRL&STAF TM PHE: CPT

## 2021-04-14 PROCEDURE — 99215 OFFICE O/P EST HI 40 MIN: CPT

## 2021-04-14 RX ORDER — ENOXAPARIN SODIUM 40 MG/.4ML
40 INJECTION SUBCUTANEOUS
Refills: 0 | Status: DISCONTINUED | COMMUNITY
End: 2021-04-14

## 2021-04-16 NOTE — HISTORY OF PRESENT ILLNESS
[Home] : at home, [unfilled] , at the time of the visit. [Medical Office: ([facility])___] : at the medical office located in  [Verbal consent obtained from patient] : the patient, [unfilled] [de-identified] : 35 Y M diagnosed with psoriasis and inflammatory ileocolonic CD with likely scarring, stricturing, and multiple fistulae (entero-enteric, entero-colon, and entero-vesicular), previously on ADA however, developed Ab due to poor compliance/intermittent dosing, Currently on monotherapy UST (started 2/4/19), however had drug holiday due to surgeries, therefore he was re-loaded with infusion March 17, 2021. Overall he feels well - did have a UTI since last visit.  S/P exp lap and sigmoid bowel resection with ileostomy creation and takedown of entero-colonic fistula, repair of bladder on 6/9/20, ileostomy closure 12/22/20. Here today feeling well, did have gas complaints at last visit - now s/p Stelara infusion feeling well. \par \par Patient reports he is healing well, wound is closed. Feels back to normal with normal BMs, alternate consistency. Some gas, but overall stable. No blood or abdominal pain. No nausea or vomiting. No fevers or chills. \par Did have UTI 3 weeks ago, was given Macrobid by his PCP with resolution of dysuria. \par \par Has psoriasis that worsened off of Stelara, has not seen derm as he reports "creams don't work."\par Of note, has abdominal hernia, and went to see a surgeon at [facility] who advised he has Sleeve gastrectomy before having Hernia repair given his morbid obesity (BMI 47). Pt interested in our opinion on this matter. \par \par PATH 12/22/20: \par Ileostomy, excision:\par - Ileostomy with chronic inflammation and fibrosis of\par mucocutaneous\par junction\par \par CTE 9/10/19: stable colo-enteric, interloop and enterovesicular fistula. Mild fibrous stenotic disease of distal ileum. No new disease or acute findings. \par \par Saw Dr. Booker since last visit reporting good wound healing. \par He was compliant with his Stelara dosing and denies any ADRs to medication.\par \par Of note, CT scan revealed ground-glass nodule in left lower lobe and saw Dr. Kennedy who has advised continued monitoring. \par \par No EIMs. \par \par IBD  history: \par \par Denies EIM. No smoking. \par + anal fissures\par + psoriasis \par \par CTE with matted small and large bowel loops and multiple fistulae, scarring\par Colonoscopy with evidence of scarring, pseudopolyps, fistula opening seen. Strictured AC, cecal inflammatory polyps. Unable to advance to TI due to scarring \par EGD with gastric erythema, path (-) for HP\par \par

## 2021-04-16 NOTE — CONSULT LETTER
[Dear  ___] : Dear  [unfilled], [Referral Letter:] : I am referring [unfilled] to you for further evaluation.  My most recent evaluation follows. [Please see my note below.] : Please see my note below. [Sincerely,] : Sincerely, [FreeTextEntry3] : Erik Royal MD\par Associate Professor of Medicine\par Director IBD Program\par [facility]\par  [DrPriti  ___] : Dr. CARO

## 2021-04-16 NOTE — PHYSICAL EXAM
[General Appearance - Alert] : alert [General Appearance - In No Acute Distress] : in no acute distress [General Appearance - Well Nourished] : well nourished [General Appearance - Well Developed] : well developed [General Appearance - Well-Appearing] : healthy appearing [Oriented To Time, Place, And Person] : oriented to person, place, and time [Affect] : the affect was normal [Mood] : the mood was normal [Neck Appearance] : the appearance of the neck was normal [Neck Cervical Mass (___cm)] : no neck mass was observed [] : no respiratory distress [Respiration, Rhythm And Depth] : normal respiratory rhythm and effort [Exaggerated Use Of Accessory Muscles For Inspiration] : no accessory muscle use [Bowel Sounds] : normal bowel sounds [Abdomen Soft] : soft [Abnormal Walk] : normal gait [Nail Clubbing] : no clubbing  or cyanosis of the fingernails [Musculoskeletal - Swelling] : no joint swelling seen [FreeTextEntry1] : psoriatic, dry flaky rash on b/l forearms

## 2021-04-16 NOTE — ASSESSMENT
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 Developed Ab to ADA in the context of intermittent dosing and poor compliance. Now on Stelara however had drug holiday due to surgery. S/P exp lap and sigmoid bowel resection with ileostomy creation and takedown of entero-colonic fistula, repair of bladder on 6/9/20, and the ileostomy reversal 12/22/20. Recovering well, received repeat loading dose of Stelara on 3/17/21. \par \par # advanced stricturing/ fistulizing ileocolonic CD - s/p surgery \par - we have restarted Stelara - given his aggressive phenotype, we re-loaded Stelara after his drug holiday (last dose ~ October 2020) and now will start Stelara Q 8 weeks - may need to inc again back to Q 4 weeks in future if disease comes back, therefore loading him will help prevent disease progression\par - plan for cscope after 2 maintenance doses of Stelara (~ 4 months) \par - will need to discuss surveillance cscope at f/u given colonic involvement and diagnosed 10 years ago \par \par # Obesity\par - patient sought out surgeon for hernia repair and was advised to have sleeve gastrectomy first, and then consider hernia repair once he's lost weight; we discussed that given his Crohn's disease, we would like to confirm he has NO upper GI Crohn's and that all of his disease is in remission before considering bariatric options; pt verbalized understanding; we will refer to Endocrinology for medical weight loss so he can start the process while he is back on Stelara treatment; he will also discuss bariatric options with Dr Rapp and Dr Larios to get educated on possible options that may be right for him.  \par \par # Enterovesicular fistula - surgically removed \par - he did have UTI 3 weeks ago resolved on MAcrobid; if recurs, he must see Urologist again \par \par # Ground glass nodule in lung\par - cont f/u Dr. Kennedy for monitoring \par \par # HCM - will need focused HCM visit \par - will need Vit D, B12 and iron panel \par - derm visit emphasized given psoriasis and biologic treatment\par - immune to Hep B\par - hep C negative \par - denies tobacco use or NSAID use\par - DEXA to be discussed at next visit \par \par F/U 8 weeks via telemed\par \par Giulia Almendarez NP

## 2021-04-28 ENCOUNTER — APPOINTMENT (OUTPATIENT)
Dept: ENDOCRINOLOGY | Facility: CLINIC | Age: 36
End: 2021-04-28
Payer: COMMERCIAL

## 2021-04-28 ENCOUNTER — APPOINTMENT (OUTPATIENT)
Dept: BARIATRICS | Facility: CLINIC | Age: 36
End: 2021-04-28

## 2021-04-28 VITALS
HEART RATE: 93 BPM | SYSTOLIC BLOOD PRESSURE: 131 MMHG | WEIGHT: 315 LBS | BODY MASS INDEX: 50.62 KG/M2 | DIASTOLIC BLOOD PRESSURE: 76 MMHG | HEIGHT: 66 IN

## 2021-04-28 PROCEDURE — 99205 OFFICE O/P NEW HI 60 MIN: CPT

## 2021-04-28 PROCEDURE — 99072 ADDL SUPL MATRL&STAF TM PHE: CPT

## 2021-04-28 NOTE — PHYSICAL EXAM
[Alert] : alert [Normal Sclera/Conjunctiva] : normal sclera/conjunctiva [Normal Outer Ear/Nose] : the ears and nose were normal in appearance [No Neck Mass] : no neck mass was observed [Thyroid Not Enlarged] : the thyroid was not enlarged [No Thyroid Nodules] : no palpable thyroid nodules [No Respiratory Distress] : no respiratory distress [Normal S1, S2] : normal S1 and S2 [No Edema] : no peripheral edema [Spine Straight] : spine straight [No Stigmata of Cushings Syndrome] : no stigmata of Cushings Syndrome [Normal Gait] : normal gait [No Rash] : no rash [Normal Reflexes] : deep tendon reflexes were 2+ and symmetric [Oriented x3] : oriented to person, place, and time

## 2021-04-29 NOTE — END OF VISIT
[FreeTextEntry3] : All medical record entries made by the Scribe were at my, Dr. Carroll Jj, direction and personally dictated by me on 04/28/2021. I have reviewed the chart and agree that the record accurately reflects my personal performance of the history, physical exam, assessment and plan. I have also personally directed, reviewed and agreed with the chart.  [Time Spent: ___ minutes] : I have spent [unfilled] minutes of time on the encounter.

## 2021-04-29 NOTE — ADDENDUM
[FreeTextEntry1] : I, Yani Simpson, acted solely as a scribe for Dr. Carroll Jj on this date. 04/28/2021.

## 2021-04-29 NOTE — REASON FOR VISIT
[Initial Evaluation] : an initial evaluation [Weight Management/Obesity] : weight management/obesity [Spouse] : spouse [FreeTextEntry2] : Dr. Erik Royal

## 2021-04-29 NOTE — ASSESSMENT
[Carbohydrate Consistent Diet] : carbohydrate consistent diet [Importance of Diet and Exercise] : importance of diet and exercise to improve glycemic control, achieve weight loss and improve cardiovascular health [Weight Loss] : weight loss [FreeTextEntry1] : 34 y/o M pt with:\par \par 1. Morbid obesity with Hx of Crohn's disease. \par No Hx of comorbidities associated with obesity. He underwent surgical GI procedures for Crohn's disease. \par He has been obese all his life.He lost 60 lbs during his teenage years with diet which he regained. During his adulthood, he was able to lose 10-30 lbs that would be recovered afterwards. He has not been on weight loss programs in the past or been under specific diet. \par Pt does not have scheduled meals. He tends to eat all day long "for no reason". \par Overview on obesity management explained to pt. \par Refer to RD and exercise . Send labs including salivary cortisol. \par \par Return in: 4 weeks.

## 2021-05-13 ENCOUNTER — APPOINTMENT (OUTPATIENT)
Dept: GASTROENTEROLOGY | Facility: HOSPITAL | Age: 36
End: 2021-05-13

## 2021-05-19 NOTE — PRE-OP CHECKLIST - SPO2 (%)
97
symptomatic bradycardia   EP eval appreciated   plan for PPM placement tomorrow, NPO after midnight  monitor electrolytes   tele monitoring, atropin at the bedside   fall precautions  Avoid AV nate blocker

## 2021-05-20 ENCOUNTER — NON-APPOINTMENT (OUTPATIENT)
Age: 36
End: 2021-05-20

## 2021-05-20 ENCOUNTER — APPOINTMENT (OUTPATIENT)
Dept: GASTROENTEROLOGY | Facility: CLINIC | Age: 36
End: 2021-05-20
Payer: COMMERCIAL

## 2021-05-20 VITALS
SYSTOLIC BLOOD PRESSURE: 141 MMHG | DIASTOLIC BLOOD PRESSURE: 71 MMHG | HEART RATE: 99 BPM | OXYGEN SATURATION: 98 % | BODY MASS INDEX: 50.62 KG/M2 | WEIGHT: 315 LBS | HEIGHT: 66 IN | RESPIRATION RATE: 15 BRPM | TEMPERATURE: 97.7 F

## 2021-05-20 DIAGNOSIS — E66.01 MORBID (SEVERE) OBESITY DUE TO EXCESS CALORIES: ICD-10-CM

## 2021-05-20 PROCEDURE — 99213 OFFICE O/P EST LOW 20 MIN: CPT

## 2021-05-26 ENCOUNTER — APPOINTMENT (OUTPATIENT)
Dept: ENDOCRINOLOGY | Facility: CLINIC | Age: 36
End: 2021-05-26

## 2021-06-08 PROBLEM — E66.01 OBESITIES, MORBID: Status: ACTIVE | Noted: 2020-01-13

## 2021-06-08 NOTE — HISTORY OF PRESENT ILLNESS
[FreeTextEntry1] : 36 with BMI of 52\par Has Crohns disease on Stellara, and has had prior bowel resections for fibro stenotic disease\par He denies diarrhea or steatorrhea\par His weight has not changed \par He would like to discuss endoscopic weight loss options

## 2021-06-08 NOTE — ASSESSMENT
[FreeTextEntry1] : I have discussed the various endoscopic weight loss options including a sleeve gastroplasty and endoscopic balloons \par he does not want surgery due to his past surgeries related to his Crohns disease\par I have discussed that the cornerstone of any successful management at this weight will be diet, nutrition and possibly medical weight loss\par We can consider an ESG on top of this\par Refer to Dr. Jose Robbins to discuss weight loss options

## 2021-06-08 NOTE — PHYSICAL EXAM
[General Appearance - Alert] : alert [General Appearance - In No Acute Distress] : in no acute distress [General Appearance - Well Nourished] : well nourished [General Appearance - Well Developed] : well developed [Sclera] : the sclera and conjunctiva were normal [Neck Appearance] : the appearance of the neck was normal [Neck Cervical Mass (___cm)] : no neck mass was observed [Jugular Venous Distention Increased] : there was no jugular-venous distention [Exaggerated Use Of Accessory Muscles For Inspiration] : no accessory muscle use [Heart Sounds] : normal S1 and S2 [Edema] : there was no peripheral edema [Abdomen Tenderness] : non-tender [No CVA Tenderness] : no ~M costovertebral angle tenderness [No Spinal Tenderness] : no spinal tenderness [Nail Clubbing] : no clubbing  or cyanosis of the fingernails [] : no rash [No Focal Deficits] : no focal deficits [Oriented To Time, Place, And Person] : oriented to person, place, and time [Impaired Insight] : insight and judgment were intact [Affect] : the affect was normal

## 2021-06-15 ENCOUNTER — APPOINTMENT (OUTPATIENT)
Dept: GASTROENTEROLOGY | Facility: CLINIC | Age: 36
End: 2021-06-15
Payer: COMMERCIAL

## 2021-06-15 PROCEDURE — 99442: CPT

## 2021-06-15 NOTE — HISTORY OF PRESENT ILLNESS
[Home] : at home, [unfilled] , at the time of the visit. [Medical Office: ([facility])___] : at the medical office located in  [Verbal consent obtained from patient] : the patient, [unfilled] [de-identified] : 36 Y M diagnosed with psoriasis and inflammatory ileocolonic CD with likely scarring, stricturing, and multiple fistulae (entero-enteric, entero-colon, and entero-vesicular), previously on ADA however, developed Ab due to poor compliance/intermittent dosing, Currently on monotherapy UST (started 2/4/19), however had drug holiday due to surgeries, therefore he was re-loaded with infusion March 17, 2021. Overall he feels well - did have a UTI since last visit.  S/P exp lap and sigmoid bowel resection with ileostomy creation and takedown of entero-colonic fistula, repair of bladder on 6/9/20, ileostomy closure 12/22/20. Here today feeling well, did have gas complaints at last visit - now back on Stelara maintenance Q 8 weeks. \par \par Patient reports he is healing well, wound is closed. Feels back to normal with normal BMs, alternate consistency. Some gas, but overall stable. No blood or abdominal pain. No nausea or vomiting. No fevers or chills. \par \par Has psoriasis that worsened off of Stelara, has not seen derm as he reports "creams don't work."\par Of note, has abdominal hernia, and went to see a surgeon at [facility] who advised he has Sleeve gastrectomy before having Hernia repair given his morbid obesity (BMI 47). Pt interested in our opinion on this matter. He has since seen Dr. Larios and is pending nutritional consult. \par \par PATH 12/22/20: \par Ileostomy, excision:\par - Ileostomy with chronic inflammation and fibrosis of\par mucocutaneous\par junction\par \par CTE 9/10/19: stable colo-enteric, interloop and enterovesicular fistula. Mild fibrous stenotic disease of distal ileum. No new disease or acute findings. \par \par Saw Dr. Booker since last visit reporting good wound healing. \par He was compliant with his Stelara dosing and denies any ADRs to medication.\par \par Of note, CT scan revealed ground-glass nodule in left lower lobe and saw Dr. Kennedy who has advised continued monitoring. \par \par No EIMs. \par \par IBD  history: \par \par Denies EIM. No smoking. \par + anal fissures\par + psoriasis \par \par CTE with matted small and large bowel loops and multiple fistulae, scarring\par Colonoscopy with evidence of scarring, pseudopolyps, fistula opening seen. Strictured AC, cecal inflammatory polyps. Unable to advance to TI due to scarring \par EGD with gastric erythema, path (-) for HP\par \par

## 2021-06-15 NOTE — ASSESSMENT
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 Developed Ab to ADA in the context of intermittent dosing and poor compliance. Now on Stelara however had drug holiday due to surgery. S/P exp lap and sigmoid bowel resection with ileostomy creation and takedown of entero-colonic fistula, repair of bladder on 6/9/20, and the ileostomy reversal 12/22/20. Recovering well, received repeat loading dose of Stelara on 3/17/21. \par \par # advanced stricturing/ fistulizing ileocolonic CD - s/p surgery \par - we have restarted Stelara - given his aggressive phenotype, we re-loaded Stelara after his drug holiday (last dose ~ October 2020) and now on Stelara Q 8 weeks - may need to inc again back to Q 4 weeks in future if disease comes back, therefore loading him will help prevent disease progression\par - plan for cscope after 2 maintenance doses of Stelara (~ 4 months - Sept) \par - will need to discuss surveillance cscope at f/u given colonic involvement and diagnosed 10 years ago \par \par # Obesity\par - patient sought out surgeon for hernia repair and was advised to have sleeve gastrectomy first, and then consider hernia repair once he's lost weight; we discussed that given his Crohn's disease, we would like to confirm he has NO upper GI Crohn's and that all of his disease is in remission before considering bariatric options; pt verbalized understanding; we will refer to Endocrinology for medical weight loss so he can start the process while he is back on Stelara treatment; he discussed endoscopic bariatric options with Dr Larios to get educated on possible options that may be right for him.  he is going to meet with nutritionist for now and hold off on surgery \par \par # Enterovesicular fistula - surgically removed \par - no further UTI since last visit; will refer to urology if recurring UTIs\par \par # Ground glass nodule in lung\par - cont f/u Dr. Kennedy for monitoring \par \par # HCM - will need focused HCM visit \par - will need Vit D, B12 and iron panel \par - derm visit emphasized given psoriasis and biologic treatment\par - immune to Hep B\par - hep C negative \par - denies tobacco use or NSAID use\par - DEXA to be discussed at next visit \par \par F/U 8 weeks via telemed\par \par Giulia Almendarez NP

## 2021-07-16 ENCOUNTER — APPOINTMENT (OUTPATIENT)
Dept: BARIATRICS/WEIGHT MGMT | Facility: CLINIC | Age: 36
End: 2021-07-16

## 2021-10-03 ENCOUNTER — LABORATORY RESULT (OUTPATIENT)
Age: 36
End: 2021-10-03

## 2021-10-05 ENCOUNTER — APPOINTMENT (OUTPATIENT)
Dept: GASTROENTEROLOGY | Facility: HOSPITAL | Age: 36
End: 2021-10-05

## 2021-10-05 ENCOUNTER — OUTPATIENT (OUTPATIENT)
Dept: OUTPATIENT SERVICES | Facility: HOSPITAL | Age: 36
LOS: 1 days | Discharge: HOME | End: 2021-10-05
Payer: COMMERCIAL

## 2021-10-05 ENCOUNTER — RESULT REVIEW (OUTPATIENT)
Age: 36
End: 2021-10-05

## 2021-10-05 DIAGNOSIS — Z98.890 OTHER SPECIFIED POSTPROCEDURAL STATES: Chronic | ICD-10-CM

## 2021-10-05 PROCEDURE — 88305 TISSUE EXAM BY PATHOLOGIST: CPT | Mod: 26

## 2021-10-05 PROCEDURE — 45380 COLONOSCOPY AND BIOPSY: CPT | Mod: GC

## 2021-10-05 PROCEDURE — 88305 TISSUE EXAM BY PATHOLOGIST: CPT

## 2021-10-05 PROCEDURE — 43239 EGD BIOPSY SINGLE/MULTIPLE: CPT

## 2021-10-05 PROCEDURE — 45380 COLONOSCOPY AND BIOPSY: CPT

## 2021-10-05 PROCEDURE — 43239 EGD BIOPSY SINGLE/MULTIPLE: CPT | Mod: GC

## 2021-10-06 ENCOUNTER — APPOINTMENT (OUTPATIENT)
Dept: BARIATRICS | Facility: CLINIC | Age: 36
End: 2021-10-06
Payer: COMMERCIAL

## 2021-10-06 VITALS
DIASTOLIC BLOOD PRESSURE: 84 MMHG | OXYGEN SATURATION: 96 % | BODY MASS INDEX: 49.44 KG/M2 | HEART RATE: 99 BPM | SYSTOLIC BLOOD PRESSURE: 139 MMHG | HEIGHT: 67 IN | WEIGHT: 315 LBS | TEMPERATURE: 97.6 F

## 2021-10-06 DIAGNOSIS — K43.9 VENTRAL HERNIA W/OUT OBSTRUCTION OR GANGRENE: ICD-10-CM

## 2021-10-06 DIAGNOSIS — I10 ESSENTIAL (PRIMARY) HYPERTENSION: ICD-10-CM

## 2021-10-06 DIAGNOSIS — G47.33 OBSTRUCTIVE SLEEP APNEA (ADULT) (PEDIATRIC): ICD-10-CM

## 2021-10-06 DIAGNOSIS — Z90.49 ACQUIRED ABSENCE OF OTHER SPECIFIED PARTS OF DIGESTIVE TRACT: ICD-10-CM

## 2021-10-06 LAB — SURGICAL PATHOLOGY STUDY: SIGNIFICANT CHANGE UP

## 2021-10-06 PROCEDURE — 99203 OFFICE O/P NEW LOW 30 MIN: CPT

## 2021-10-11 ENCOUNTER — APPOINTMENT (OUTPATIENT)
Dept: BARIATRICS | Facility: CLINIC | Age: 36
End: 2021-10-11
Payer: COMMERCIAL

## 2021-10-11 VITALS — BODY MASS INDEX: 49.44 KG/M2 | WEIGHT: 315 LBS | HEIGHT: 67 IN

## 2021-10-11 PROCEDURE — 97802 MEDICAL NUTRITION INDIV IN: CPT | Mod: 95

## 2021-10-22 ENCOUNTER — APPOINTMENT (OUTPATIENT)
Dept: GASTROENTEROLOGY | Facility: CLINIC | Age: 36
End: 2021-10-22
Payer: COMMERCIAL

## 2021-10-22 PROCEDURE — 99442: CPT

## 2021-10-22 NOTE — HISTORY OF PRESENT ILLNESS
[Home] : at home, [unfilled] , at the time of the visit. [Medical Office: ([facility])___] : at the medical office located in  [Verbal consent obtained from patient] : the patient, [unfilled] [de-identified] : 36 Y M diagnosed with psoriasis and inflammatory ileocolonic CD with likely scarring, stricturing, and multiple fistulae (entero-enteric, entero-colon, and entero-vesicular), previously on ADA however, developed Ab due to poor compliance/intermittent dosing, Currently on monotherapy UST (started 2/4/19), however had drug holiday due to surgeries, therefore he was re-loaded with infusion March 17, 2021. Overall he feels well. S/P exp lap and sigmoid bowel resection with ileostomy creation and takedown of entero-colonic fistula, repair of bladder on 6/9/20, ileostomy closure 12/22/20. Telephone visit today to go over EGD/CSCOPE report.\par \par 10/5/21\par EGD: EGD Impressions:  \par  Esophageal hiatal hernia.  \par  Normal mucosa in the whole esophagus.  \par  Normal mucosa in the stomach. (Biopsy).  \par  Normal mucosa in the whole examined duodenum. (Biopsy). \par CSCOPE: An end to end anastomosis was seen in the sigmoid colon. A small non-healing \par ulcer was seen at the anastomosis. An end to side anastomosis was seen in the \par Cecum, characterized by healthy appearance. The amado-terminal ileum was \par characterized by mildly friable mucosa with >5 aphthous ulcer, consistent with \par Rutgreets i2 (our first endoscopic chance to evaluate the TI). Biopsy was done \par from the amado-terminal ileum. Rest of the colon mucosa did not show any disease \par activity.\par PATH:\par 1. Duodenum, 2nd portion, biopsy:\par - Duodenal mucosa with preserved villous architecture and no\par significant histopathologic findings\par \par 2. Stomach, biopsy:\par - Gastric mucosa, body type, with no significant histopathologic\par findings\par \par 3. Neoterimal ileum, biopsy:\par - Active chronic enteritis with cryptitis and crypt architecture\par distortion\par - No dysplasia or malignancy identified\par \par 4. Right colon, biopsy:\par - Mild active chronic colitis with cryptitis and lymphoid aggregate\par - No dysplasia or malignancy identified\par \par 5. Left colon, biopsy:\par - Colonic mucosa  with no significant histopathologic findings\par  \par \par Patient reports feeling back to normal with normal BMs, alternate consistency. Some gas, but overall stable. No blood or abdominal pain. No nausea or vomiting. No fevers or chills. \par \par Has psoriasis that worsened off of Stelara, has not seen derm as he reports "creams don't work."\par Of note, has abdominal hernia, and went to see a surgeon at [place] who advised he has Sleeve gastrectomy before having Hernia repair given his morbid obesity (BMI 47). Pt interested in our opinion on this matter. He has since seen Dr. Larios and is pending nutritional consult. \par \par PATH 12/22/20: \par Ileostomy, excision:\par - Ileostomy with chronic inflammation and fibrosis of\par mucocutaneous\par junction\par \par CTE 9/10/19: stable colo-enteric, interloop and enterovesicular fistula. Mild fibrous stenotic disease of distal ileum. No new disease or acute findings. \par \par Saw Dr. Booker since last visit reporting good wound healing. \par He was compliant with his Stelara dosing and denies any ADRs to medication.\par \par Of note, CT scan revealed ground-glass nodule in left lower lobe and saw Dr. Kennedy who has advised continued monitoring. \par \par No EIMs. \par \par IBD  history: \par \par Denies EIM. No smoking. \par + anal fissures\par + psoriasis \par \par CTE with matted small and large bowel loops and multiple fistulae, scarring\par Colonoscopy with evidence of scarring, pseudopolyps, fistula opening seen. Strictured AC, cecal inflammatory polyps. Unable to advance to TI due to scarring \par EGD with gastric erythema, path (-) for HP\par \par

## 2021-10-22 NOTE — ASSESSMENT
[FreeTextEntry1] : 36 Y M, diagnosed with inflammatory ileocolonic CD dx 10 years ago, likely had ongoing inflammation over several years with scarring, stricturing and multiple fistulae- entero-enteric, entero-colonic and entero-vescicular. Developed Ab to ADA in the context of intermittent dosing and poor compliance. Now on Stelara however had drug holiday due to surgery. S/P exp lap and sigmoid bowel resection with ileostomy creation and takedown of entero-colonic fistula, repair of bladder on 6/9/20, and the ileostomy reversal 12/22/20. Recovering well, received repeat loading dose of Stelara on 3/17/21. Telephone call s/p EGD/CSCOPE. CSCOPE showing i2 recurrence, EGD normal. \par \par # advanced stricturing/ fistulizing ileocolonic CD - s/p surgery \par - we have restarted Stelara - given his aggressive phenotype, we re-loaded Stelara after his drug holiday (last dose ~ October 2020) and now on Stelara Q 8 weeks \par - cscope with i2 recurrence; pending drug levels to discuss consideration if escalating therapy is needed \par - plan for repeat cscope in 1 year, sooner if indicated \par \par # Obesity\par - patient sought out surgeon for hernia repair and was advised to have sleeve gastrectomy first, and then consider hernia repair once he's lost weight; we discussed that given his Crohn's disease, we would like to confirm he has NO upper GI Crohn's and that all of his disease is in remission before considering bariatric options; pt verbalized understanding; we will refer to Endocrinology for medical weight loss so he can start the process while he is back on Stelara treatment; he discussed endoscopic bariatric options with Dr Larios to get educated on possible options that may be right for him.  he is going to meet with nutritionist as well\par - he is pending sleeve gastrectomy wt Dr. Rapp which is his preference  \par \par # Enterovesicular fistula - surgically removed \par - no further UTI since last visit; will refer to urology if recurring UTIs\par \par # Ground glass nodule in lung\par - cont f/u Dr. Kennedy for monitoring \par \par # HCM - will need focused HCM visit \par - will need Vit D, B12 and iron panel \par - derm visit emphasized given psoriasis and biologic treatment\par - immune to Hep B\par - hep C negative \par - denies tobacco use or NSAID use\par - DEXA to be discussed at next visit \par \par F/U after Stelara level

## 2021-11-02 ENCOUNTER — NON-APPOINTMENT (OUTPATIENT)
Age: 36
End: 2021-11-02

## 2021-11-02 PROBLEM — G47.33 OBSTRUCTIVE SLEEP APNEA: Status: ACTIVE | Noted: 2021-10-06

## 2021-11-02 NOTE — HISTORY OF PRESENT ILLNESS
[de-identified] : Patient is a 36 year old male with along history of morbid obesity not responsive to multiple dietary regimens. He has a BMI of 49.8 and weight-related comorbidities including hypertension and obstructive sleep apnea. His past medical history is significant for Crohn's disease complicated by an entero-colonic vesical fistula. He had an exploratory laparotomy, ileocolic resection, sigmoid colon resection, repair of entero-colonic vesical fistula and ileostomy reversal in June 2020. He underwent ileostomy reversal on December 2020.

## 2021-11-02 NOTE — PHYSICAL EXAM
[Obese, well nourished, in no acute distress] : obese, well nourished, in no acute distress [Normal] : affect appropriate [de-identified] : well-healed surgical scars, obese

## 2021-11-02 NOTE — REVIEW OF SYSTEMS
[Patient Intake Form Reviewed] : Patient intake form was reviewed [Abdominal Pain] : abdominal pain [Vomiting] : no vomiting [Constipation] : no constipation [Diarrhea] : no diarrhea [Reflux/Heartburn] : no reflex/heartburn [Hernia] : no hernia [Negative] : Psychiatric

## 2021-11-02 NOTE — ASSESSMENT
[FreeTextEntry1] : He meets the NIH criteria for bariatric surgery and would like to have a laparoscopic sleeve gastrectomy. I have reviewed the risks and benefits of the procedure with the patient, and he understands this information fully. We discussed how the operation may be more complicated secondary to scar tissue from his previous surgeries.

## 2021-11-15 ENCOUNTER — NON-APPOINTMENT (OUTPATIENT)
Age: 36
End: 2021-11-15

## 2021-11-15 ENCOUNTER — APPOINTMENT (OUTPATIENT)
Dept: BARIATRICS | Facility: CLINIC | Age: 36
End: 2021-11-15

## 2021-11-23 ENCOUNTER — APPOINTMENT (OUTPATIENT)
Dept: BARIATRICS | Facility: CLINIC | Age: 36
End: 2021-11-23
Payer: COMMERCIAL

## 2021-11-23 VITALS — WEIGHT: 312 LBS | BODY MASS INDEX: 48.97 KG/M2 | HEIGHT: 67 IN

## 2021-11-23 DIAGNOSIS — E66.01 MORBID (SEVERE) OBESITY DUE TO EXCESS CALORIES: ICD-10-CM

## 2021-11-23 PROCEDURE — ZZZZZ: CPT

## 2021-12-09 ENCOUNTER — APPOINTMENT (OUTPATIENT)
Dept: BARIATRICS | Facility: CLINIC | Age: 36
End: 2021-12-09
Payer: COMMERCIAL

## 2021-12-09 VITALS — WEIGHT: 315 LBS | HEIGHT: 67 IN | BODY MASS INDEX: 49.44 KG/M2

## 2021-12-09 PROCEDURE — 99442: CPT | Mod: 95

## 2021-12-09 NOTE — HISTORY OF PRESENT ILLNESS
[Home] : at home, [unfilled] , at the time of the visit. [Medical Office: ([facility])___] : at the medical office located in  [Verbal consent obtained from patient] : the patient, [unfilled] [de-identified] : Mr. Russell presents today for a final visit for a laparoscopic sleeve gastrectomy scheduled on 12/20. EGD reviewed. Alternatives, risks and benefits discussed. All questions answered and preop instructions given. BSTOP, use and rationale of non-opioid medications for pain management explained, patient verbalized understanding. Patient was instructed to postpone his next dose of Stelara before surgery.

## 2021-12-09 NOTE — ASSESSMENT
[FreeTextEntry1] : 36 year old male with history of morbid obesity. HTN, RAZIA and Crohn's disease requiring a colon resection presenting for a final visit for a lap sleeve gastrectomy. Patient was instructed to hold his next dose of Stelara before surgery as per Dr. Rapp. All questions answered and preop instructions given.

## 2021-12-17 VITALS
TEMPERATURE: 97 F | HEIGHT: 66 IN | WEIGHT: 315 LBS | SYSTOLIC BLOOD PRESSURE: 120 MMHG | OXYGEN SATURATION: 96 % | RESPIRATION RATE: 16 BRPM | HEART RATE: 86 BPM | DIASTOLIC BLOOD PRESSURE: 82 MMHG

## 2021-12-17 RX ORDER — INFLUENZA VIRUS VACCINE 15; 15; 15; 15 UG/.5ML; UG/.5ML; UG/.5ML; UG/.5ML
0.5 SUSPENSION INTRAMUSCULAR ONCE
Refills: 0 | Status: COMPLETED | OUTPATIENT
Start: 2021-12-20 | End: 2021-12-20

## 2021-12-17 RX ORDER — USTEKINUMAB 45 MG/.5ML
0 INJECTION, SOLUTION SUBCUTANEOUS
Qty: 0 | Refills: 0 | DISCHARGE

## 2021-12-17 NOTE — PATIENT PROFILE ADULT - FALL HARM RISK - UNIVERSAL INTERVENTIONS
Bed in lowest position, wheels locked, appropriate side rails in place/Call bell, personal items and telephone in reach/Instruct patient to call for assistance before getting out of bed or chair/Non-slip footwear when patient is out of bed/[place] to call system/Physically safe environment - no spills, clutter or unnecessary equipment/Purposeful Proactive Rounding/Room/bathroom lighting operational, light cord in reach

## 2021-12-19 ENCOUNTER — TRANSCRIPTION ENCOUNTER (OUTPATIENT)
Age: 36
End: 2021-12-19

## 2021-12-19 NOTE — H&P ADULT - HISTORY OF PRESENT ILLNESS
36M w/ PMHx of HTN, RAZIA, Crohn's disease w/ entero-colonic fistula (s/p ex lap, ileocolic resection, sigmoidectomy, repair of entero-colonic fistula, and ileostomy in 6/2020 and ileostomy reversal 12/2020), and morbid obesity presents for elective sleeve gastrectomy on 12/20. Patient has a longstanding history of morbid obesity that is not responsive to diet and exercise as well as weight-related comorbidities of RAZIA and HTN. Currently feels well with no complaints.     PMH: as above  PSH: as above  Meds: stelara injections every 8 weeks  All: NKDA  SH: never a smoker

## 2021-12-19 NOTE — H&P ADULT - ASSESSMENT
36M w/ PMHx of HTN, RAZIA, Crohn's disease w/ entero-colonic fistula (s/p ex lap, ileocolic resection, sigmoidectomy, repair of entero-colonic fistula, and ileostomy in 6/2020 and ileostomy reversal 12/2020), and morbid obesity presents for elective sleeve gastrectomy on 12/20.     - BCLD   - MIVF: 150mL/hr LR  - Protonix  - Toradol (start 12 hours after case) q6 hours  - Tylenol IV -> PO as tolerated  - Zofran  - Dietician consult am  - OOB/AMB/IS/SCDs  - CBC 6 hours post op  - Start HSQ POD #1 after a.m. CBC

## 2021-12-19 NOTE — H&P ADULT - NSHPPHYSICALEXAM_GEN_ALL_CORE
Gen: NAD, resting comfortably in bed  Pulm: Good inspiratory effort, nonlabored breathing  C/V: regular rate and rhythm   Abd: obese, soft, nontender, nondistended  Extrem: WWP, no edema  Neuro: A&Ox3, no focal deficits

## 2021-12-20 ENCOUNTER — APPOINTMENT (OUTPATIENT)
Dept: SURGERY | Facility: HOSPITAL | Age: 36
End: 2021-12-20

## 2021-12-20 ENCOUNTER — RESULT REVIEW (OUTPATIENT)
Age: 36
End: 2021-12-20

## 2021-12-20 ENCOUNTER — APPOINTMENT (OUTPATIENT)
Dept: BARIATRICS | Facility: HOSPITAL | Age: 36
End: 2021-12-20

## 2021-12-20 ENCOUNTER — INPATIENT (INPATIENT)
Facility: HOSPITAL | Age: 36
LOS: 0 days | Discharge: HOME | DRG: 620 | End: 2021-12-21
Attending: SURGERY | Admitting: SURGERY
Payer: COMMERCIAL

## 2021-12-20 DIAGNOSIS — Z98.890 OTHER SPECIFIED POSTPROCEDURAL STATES: Chronic | ICD-10-CM

## 2021-12-20 LAB
BLD GP AB SCN SERPL QL: NEGATIVE — SIGNIFICANT CHANGE UP
HCT VFR BLD CALC: 44.4 % — SIGNIFICANT CHANGE UP (ref 39–50)
HGB BLD-MCNC: 13.9 G/DL — SIGNIFICANT CHANGE UP (ref 13–17)
MCHC RBC-ENTMCNC: 23.9 PG — LOW (ref 27–34)
MCHC RBC-ENTMCNC: 31.3 GM/DL — LOW (ref 32–36)
MCV RBC AUTO: 76.3 FL — LOW (ref 80–100)
NRBC # BLD: 0 /100 WBCS — SIGNIFICANT CHANGE UP (ref 0–0)
PLATELET # BLD AUTO: 282 K/UL — SIGNIFICANT CHANGE UP (ref 150–400)
RBC # BLD: 5.82 M/UL — HIGH (ref 4.2–5.8)
RBC # FLD: 15.5 % — HIGH (ref 10.3–14.5)
RH IG SCN BLD-IMP: POSITIVE — SIGNIFICANT CHANGE UP
WBC # BLD: 14.34 K/UL — HIGH (ref 3.8–10.5)
WBC # FLD AUTO: 14.34 K/UL — HIGH (ref 3.8–10.5)

## 2021-12-20 PROCEDURE — 43332 TRANSAB ESOPH HIAT HERN RPR: CPT | Mod: GC,22

## 2021-12-20 PROCEDURE — 43775 LAP SLEEVE GASTRECTOMY: CPT | Mod: GC,22

## 2021-12-20 PROCEDURE — 88307 TISSUE EXAM BY PATHOLOGIST: CPT | Mod: 26

## 2021-12-20 RX ORDER — BUPIVACAINE 13.3 MG/ML
20 INJECTION, SUSPENSION, LIPOSOMAL INFILTRATION ONCE
Refills: 0 | Status: DISCONTINUED | OUTPATIENT
Start: 2021-12-20 | End: 2021-12-20

## 2021-12-20 RX ORDER — ACETAMINOPHEN 500 MG
1000 TABLET ORAL ONCE
Refills: 0 | Status: COMPLETED | OUTPATIENT
Start: 2021-12-21 | End: 2021-12-21

## 2021-12-20 RX ORDER — SODIUM CHLORIDE 9 MG/ML
1000 INJECTION, SOLUTION INTRAVENOUS
Refills: 0 | Status: DISCONTINUED | OUTPATIENT
Start: 2021-12-20 | End: 2021-12-21

## 2021-12-20 RX ORDER — ACETAMINOPHEN 500 MG
1000 TABLET ORAL ONCE
Refills: 0 | Status: COMPLETED | OUTPATIENT
Start: 2021-12-20 | End: 2021-12-20

## 2021-12-20 RX ORDER — PANTOPRAZOLE SODIUM 20 MG/1
40 TABLET, DELAYED RELEASE ORAL DAILY
Refills: 0 | Status: DISCONTINUED | OUTPATIENT
Start: 2021-12-20 | End: 2021-12-21

## 2021-12-20 RX ORDER — SCOPALAMINE 1 MG/3D
1 PATCH, EXTENDED RELEASE TRANSDERMAL ONCE
Refills: 0 | Status: COMPLETED | OUTPATIENT
Start: 2021-12-20 | End: 2021-12-20

## 2021-12-20 RX ORDER — HYDROMORPHONE HYDROCHLORIDE 2 MG/ML
0.25 INJECTION INTRAMUSCULAR; INTRAVENOUS; SUBCUTANEOUS
Refills: 0 | Status: DISCONTINUED | OUTPATIENT
Start: 2021-12-20 | End: 2021-12-21

## 2021-12-20 RX ORDER — ACETAMINOPHEN 500 MG
650 TABLET ORAL EVERY 6 HOURS
Refills: 0 | Status: DISCONTINUED | OUTPATIENT
Start: 2021-12-20 | End: 2021-12-21

## 2021-12-20 RX ORDER — GABAPENTIN 400 MG/1
300 CAPSULE ORAL ONCE
Refills: 0 | Status: COMPLETED | OUTPATIENT
Start: 2021-12-20 | End: 2021-12-20

## 2021-12-20 RX ORDER — KETOROLAC TROMETHAMINE 30 MG/ML
15 SYRINGE (ML) INJECTION EVERY 6 HOURS
Refills: 0 | Status: DISCONTINUED | OUTPATIENT
Start: 2021-12-20 | End: 2021-12-21

## 2021-12-20 RX ORDER — APREPITANT 80 MG/1
40 CAPSULE ORAL ONCE
Refills: 0 | Status: COMPLETED | OUTPATIENT
Start: 2021-12-20 | End: 2021-12-20

## 2021-12-20 RX ORDER — ENOXAPARIN SODIUM 100 MG/ML
40 INJECTION SUBCUTANEOUS ONCE
Refills: 0 | Status: COMPLETED | OUTPATIENT
Start: 2021-12-20 | End: 2021-12-20

## 2021-12-20 RX ORDER — ONDANSETRON 8 MG/1
4 TABLET, FILM COATED ORAL EVERY 6 HOURS
Refills: 0 | Status: DISCONTINUED | OUTPATIENT
Start: 2021-12-20 | End: 2021-12-21

## 2021-12-20 RX ADMIN — GABAPENTIN 300 MILLIGRAM(S): 400 CAPSULE ORAL at 08:43

## 2021-12-20 RX ADMIN — SCOPALAMINE 1 PATCH: 1 PATCH, EXTENDED RELEASE TRANSDERMAL at 18:57

## 2021-12-20 RX ADMIN — SCOPALAMINE 1 PATCH: 1 PATCH, EXTENDED RELEASE TRANSDERMAL at 08:35

## 2021-12-20 RX ADMIN — Medication 1000 MILLIGRAM(S): at 08:34

## 2021-12-20 RX ADMIN — Medication 650 MILLIGRAM(S): at 18:09

## 2021-12-20 RX ADMIN — ENOXAPARIN SODIUM 40 MILLIGRAM(S): 100 INJECTION SUBCUTANEOUS at 08:35

## 2021-12-20 RX ADMIN — Medication 400 MILLIGRAM(S): at 23:32

## 2021-12-20 RX ADMIN — Medication 15 MILLIGRAM(S): at 23:51

## 2021-12-20 RX ADMIN — Medication 650 MILLIGRAM(S): at 18:56

## 2021-12-20 RX ADMIN — SODIUM CHLORIDE 185 MILLILITER(S): 9 INJECTION, SOLUTION INTRAVENOUS at 13:24

## 2021-12-20 RX ADMIN — HYDROMORPHONE HYDROCHLORIDE 0.25 MILLIGRAM(S): 2 INJECTION INTRAMUSCULAR; INTRAVENOUS; SUBCUTANEOUS at 14:48

## 2021-12-20 RX ADMIN — Medication 15 MILLIGRAM(S): at 23:31

## 2021-12-20 RX ADMIN — HYDROMORPHONE HYDROCHLORIDE 0.25 MILLIGRAM(S): 2 INJECTION INTRAMUSCULAR; INTRAVENOUS; SUBCUTANEOUS at 14:32

## 2021-12-20 RX ADMIN — APREPITANT 40 MILLIGRAM(S): 80 CAPSULE ORAL at 08:35

## 2021-12-20 RX ADMIN — Medication 1000 MILLIGRAM(S): at 23:52

## 2021-12-20 RX ADMIN — PANTOPRAZOLE SODIUM 40 MILLIGRAM(S): 20 TABLET, DELAYED RELEASE ORAL at 13:26

## 2021-12-20 NOTE — BRIEF OPERATIVE NOTE - NSICDXBRIEFPROCEDURE_GEN_ALL_CORE_FT
PROCEDURES:  Robot-assisted sleeve gastrectomy 20-Dec-2021 12:29:37  Karen Diaz  Robot-assisted laparoscopic lysis of abdominal adhesions 20-Dec-2021 12:29:58  Karen Diaz  Robot-assisted repair of sliding hiatal hernia 20-Dec-2021 12:30:22  Karen Diaz

## 2021-12-20 NOTE — PACU DISCHARGE NOTE - COMMENTS
PACU criteria met. Report given to KVNG Murdock RN. Patient transported to [facility] via bed, by PCA. PACU criteria met. Report given to Ms. Lynda RN. Patient to be transported to [address] [place] [address]  via bed, monitored, by  RN and PCA.

## 2021-12-20 NOTE — BRIEF OPERATIVE NOTE - OPERATION/FINDINGS
Veress insufflation and optiview entry. Additional ports placed under direct visualization making sure to avoid his large ventral hernia. Lysis of adhesions from prior operations performed for >30 min. Stomach divided along Bougie edge using robotic stapler. Omentum sutured to greater curvature of sleeved stomach. Hemostasis achieved. Fascia at 15mm post site closed. Port sites closed w/ 4-0 Monocryl sutures & surgical glue.

## 2021-12-20 NOTE — BRIEF OPERATIVE NOTE - NSICDXBRIEFPOSTOP_GEN_ALL_CORE_FT
POST-OP DIAGNOSIS:  Hiatal hernia 20-Dec-2021 12:31:54  Karen Diaz  Morbid obesity 20-Dec-2021 12:32:04  Karen Diaz

## 2021-12-21 ENCOUNTER — TRANSCRIPTION ENCOUNTER (OUTPATIENT)
Age: 36
End: 2021-12-21

## 2021-12-21 VITALS
TEMPERATURE: 98 F | SYSTOLIC BLOOD PRESSURE: 135 MMHG | HEART RATE: 88 BPM | OXYGEN SATURATION: 94 % | DIASTOLIC BLOOD PRESSURE: 79 MMHG | RESPIRATION RATE: 17 BRPM

## 2021-12-21 LAB
ANION GAP SERPL CALC-SCNC: 9 MMOL/L — SIGNIFICANT CHANGE UP (ref 5–17)
BUN SERPL-MCNC: 13 MG/DL — SIGNIFICANT CHANGE UP (ref 7–23)
CALCIUM SERPL-MCNC: 8.9 MG/DL — SIGNIFICANT CHANGE UP (ref 8.4–10.5)
CHLORIDE SERPL-SCNC: 104 MMOL/L — SIGNIFICANT CHANGE UP (ref 96–108)
CO2 SERPL-SCNC: 27 MMOL/L — SIGNIFICANT CHANGE UP (ref 22–31)
CREAT SERPL-MCNC: 0.75 MG/DL — SIGNIFICANT CHANGE UP (ref 0.5–1.3)
GLUCOSE SERPL-MCNC: 112 MG/DL — HIGH (ref 70–99)
HCT VFR BLD CALC: 40.5 % — SIGNIFICANT CHANGE UP (ref 39–50)
HGB BLD-MCNC: 12.3 G/DL — LOW (ref 13–17)
MAGNESIUM SERPL-MCNC: 2.2 MG/DL — SIGNIFICANT CHANGE UP (ref 1.6–2.6)
MCHC RBC-ENTMCNC: 23.2 PG — LOW (ref 27–34)
MCHC RBC-ENTMCNC: 30.4 GM/DL — LOW (ref 32–36)
MCV RBC AUTO: 76.4 FL — LOW (ref 80–100)
NRBC # BLD: 0 /100 WBCS — SIGNIFICANT CHANGE UP (ref 0–0)
PHOSPHATE SERPL-MCNC: 4.2 MG/DL — SIGNIFICANT CHANGE UP (ref 2.5–4.5)
PLATELET # BLD AUTO: 246 K/UL — SIGNIFICANT CHANGE UP (ref 150–400)
POTASSIUM SERPL-MCNC: 4.1 MMOL/L — SIGNIFICANT CHANGE UP (ref 3.5–5.3)
POTASSIUM SERPL-SCNC: 4.1 MMOL/L — SIGNIFICANT CHANGE UP (ref 3.5–5.3)
RBC # BLD: 5.3 M/UL — SIGNIFICANT CHANGE UP (ref 4.2–5.8)
RBC # FLD: 15.6 % — HIGH (ref 10.3–14.5)
SODIUM SERPL-SCNC: 140 MMOL/L — SIGNIFICANT CHANGE UP (ref 135–145)
WBC # BLD: 9.18 K/UL — SIGNIFICANT CHANGE UP (ref 3.8–10.5)
WBC # FLD AUTO: 9.18 K/UL — SIGNIFICANT CHANGE UP (ref 3.8–10.5)

## 2021-12-21 RX ORDER — OMEPRAZOLE 10 MG/1
1 CAPSULE, DELAYED RELEASE ORAL
Qty: 30 | Refills: 0
Start: 2021-12-21 | End: 2022-01-19

## 2021-12-21 RX ORDER — ACETAMINOPHEN 500 MG
2 TABLET ORAL
Qty: 32 | Refills: 0
Start: 2021-12-21 | End: 2021-12-24

## 2021-12-21 RX ORDER — USTEKINUMAB 45 MG/.5ML
0 INJECTION, SOLUTION SUBCUTANEOUS
Qty: 0 | Refills: 0 | DISCHARGE

## 2021-12-21 RX ORDER — SODIUM CHLORIDE 9 MG/ML
1000 INJECTION, SOLUTION INTRAVENOUS
Refills: 0 | Status: DISCONTINUED | OUTPATIENT
Start: 2021-12-21 | End: 2021-12-21

## 2021-12-21 RX ORDER — APIXABAN 2.5 MG/1
1 TABLET, FILM COATED ORAL
Qty: 60 | Refills: 0
Start: 2021-12-21 | End: 2022-01-19

## 2021-12-21 RX ORDER — HEPARIN SODIUM 5000 [USP'U]/ML
7500 INJECTION INTRAVENOUS; SUBCUTANEOUS EVERY 8 HOURS
Refills: 0 | Status: DISCONTINUED | OUTPATIENT
Start: 2021-12-21 | End: 2021-12-21

## 2021-12-21 RX ORDER — AMOXICILLIN 250 MG/5ML
1 SUSPENSION, RECONSTITUTED, ORAL (ML) ORAL
Qty: 0 | Refills: 0 | DISCHARGE

## 2021-12-21 RX ADMIN — HEPARIN SODIUM 7500 UNIT(S): 5000 INJECTION INTRAVENOUS; SUBCUTANEOUS at 15:06

## 2021-12-21 RX ADMIN — SODIUM CHLORIDE 75 MILLILITER(S): 9 INJECTION, SOLUTION INTRAVENOUS at 09:57

## 2021-12-21 RX ADMIN — Medication 15 MILLIGRAM(S): at 12:17

## 2021-12-21 RX ADMIN — SCOPALAMINE 1 PATCH: 1 PATCH, EXTENDED RELEASE TRANSDERMAL at 07:31

## 2021-12-21 RX ADMIN — HEPARIN SODIUM 7500 UNIT(S): 5000 INJECTION INTRAVENOUS; SUBCUTANEOUS at 08:16

## 2021-12-21 RX ADMIN — Medication 15 MILLIGRAM(S): at 05:48

## 2021-12-21 RX ADMIN — Medication 1000 MILLIGRAM(S): at 06:08

## 2021-12-21 RX ADMIN — Medication 15 MILLIGRAM(S): at 12:06

## 2021-12-21 RX ADMIN — PANTOPRAZOLE SODIUM 40 MILLIGRAM(S): 20 TABLET, DELAYED RELEASE ORAL at 12:06

## 2021-12-21 RX ADMIN — Medication 15 MILLIGRAM(S): at 18:04

## 2021-12-21 RX ADMIN — Medication 15 MILLIGRAM(S): at 06:08

## 2021-12-21 RX ADMIN — Medication 15 MILLIGRAM(S): at 17:49

## 2021-12-21 RX ADMIN — Medication 400 MILLIGRAM(S): at 05:48

## 2021-12-21 NOTE — DIETITIAN INITIAL EVALUATION ADULT. - OTHER INFO
36M w/ PMHx of HTN, RAZIA, Crohn's disease w/ entero-colonic fistula (s/p ex lap, ileocolic resection, sigmoidectomy, repair of entero-colonic fistula, and ileostomy in 6/2020 and ileostomy reversal 12/2020), and morbid obesity presents for elective sleeve gastrectomy on 12/20.    On assessment, pt resting in bed, sleeping. Attempted to wake but unable to arouse at this time. Currently on BARICLLIQ diet, tolerating PO as noted in chart. Pain and nausea well controlled. Left written handout with pt on bedside table for pt to review when able. NKFA. Skin: Art 20, surgical incisions. GI: WDL per flowsheet. RD to follow up per protocol.

## 2021-12-21 NOTE — DISCHARGE NOTE PROVIDER - NSDCCPCAREPLAN_GEN_ALL_CORE_FT
PRINCIPAL DISCHARGE DIAGNOSIS  Diagnosis: Morbid obesity  Assessment and Plan of Treatment: 36 year old male with past medical history of HTN, RAZIA, morbid obesity ( BMI 51) Crohn's disease w/ entero-colonic fistula (s/p ex lap, ileocolic resection, sigmoidectomy, repair of entero-colonic fistula, and ileostomy in 6/2020 and ileostomy reversal 12/2020), s/p robotic assisted sleeve gastrectomy on 12/20.  Pt tolerated the procedure well. At time of discharge, pt was tolerating a bariatric clear liquid diet, and pt's pain was controlled. Plan is to follow up with Dr. Rapp  in the office.  1) Please take Tylenol 650 mg every 4 to 6 hours by mouth for moderate pain control. Please do not exceed over 4,000 mg of Tylenol a day.  2) Please start taking Eliquis 2.5 mg by mouth twice a day starting 3 days after surgery, Thursday December 23, 2021 .  3) Please take Omeprazole 40 mg once a day by mouth.

## 2021-12-21 NOTE — DISCHARGE NOTE NURSING/CASE MANAGEMENT/SOCIAL WORK - PATIENT PORTAL LINK FT
You can access the FollowMyHealth Patient Portal offered by [facility] by registering at the following website: [email or web address]. By joining [facility]’s FollowMyHealth portal, you will also be able to view your health information using other applications (apps) compatible with our system.

## 2021-12-21 NOTE — DISCHARGE NOTE PROVIDER - DETAILS OF MALNUTRITION DIAGNOSIS/DIAGNOSES
This patient has been assessed with a concern for Malnutrition and was treated during this hospitalization for the following Nutrition diagnosis/diagnoses:     -  12/21/2021: Morbid obesity (BMI > 40)

## 2021-12-21 NOTE — DISCHARGE NOTE PROVIDER - CARE PROVIDER_API CALL
Saroj Rapp (MD)  Surgery  [address]  [place], [place] [address]  Phone: [phone]  Fax: [phone]  Scheduled Appointment: 01/12/2022

## 2021-12-21 NOTE — DIETITIAN NUTRITION RISK NOTIFICATION - TREATMENT: THE FOLLOWING DIET HAS BEEN RECOMMENDED
1. BARICLLIQ diet 2. Recommend advance to phase 1 bariatric full liquid diet when medically feasible 3. Encourage adequate hydration with goal of 4oz/hr and/or 64 oz/day

## 2021-12-21 NOTE — DIETITIAN INITIAL EVALUATION ADULT. - OTHER CALCULATIONS
IBW used to calculate energy needs due to pt's current body weight exceeding 120% of IBW (167%). Above energy needs calculated for wt maintenance (20-25kcal/kg).   Weeks 1-2 estimated needs: kcal/day (10-12kcal/kg), g pro/day (1.5-2.1g/kg), >/=64oz clear fluids.

## 2021-12-21 NOTE — PROGRESS NOTE ADULT - SUBJECTIVE AND OBJECTIVE BOX
POST-OPERATIVE NOTE    Procedure: Robot-assisted sleeve gastrectomy    Robot-assisted laparoscopic lysis of abdominal adhesions    Robot-assisted repair of sliding hiatal hernia        Surgeon: KAREN Rapp    S: C/o mid epigastric pain since surgery. No nausea or vomiting    O:  T(C): 36.7 (12-20-21 @ 12:30), Max: 36.7 (12-20-21 @ 12:30)  HR: 86 (12-20-21 @ 14:30) (81 - 87)  BP: 118/57 (12-20-21 @ 14:30) (88/50 - 126/57)  BP(mean): 82 (12-20-21 @ 14:00) (79 - 83)  RR: 27 (12-20-21 @ 14:30) (17 - 27)  SpO2: 97% (12-20-21 @ 14:30) (94% - 99%)      Physical Exam  Gen: NAD, resting comfortably in bed  C/V: NSR  Pulm: Nonlabored breathing, no respiratory distress  Abd: soft, NT/ND, no rebound or guarding, incisions cdi  Extrem: WWP, no calf edema, SCDs in place      A/P: 36yMale s/p above procedure  BCLD  LR  Pain/nausea control: Toradol 12q6 12hrs after case, Tylenol, Zofran  Nutrition consult  fu CBC 6 hours  PPI 40mg IV qd  OOBAT
SUBJECTIVE: Patient without specific complaint this AM. Tolerating minimal PO. Did not use CPAP overnight      MEDICATIONS  (STANDING):  acetaminophen    Suspension .. 650 milliGRAM(s) Oral every 6 hours  influenza   Vaccine 0.5 milliLiter(s) IntraMuscular once  ketorolac   Injectable 15 milliGRAM(s) IV Push every 6 hours  lactated ringers. 1000 milliLiter(s) (185 mL/Hr) IV Continuous <Continuous>  pantoprazole  Injectable 40 milliGRAM(s) IV Push daily    MEDICATIONS  (PRN):  HYDROmorphone  Injectable 0.25 milliGRAM(s) IV Push every 30 minutes PRN Severe Pain (7 - 10)  ondansetron Injectable 4 milliGRAM(s) IV Push every 6 hours PRN Nausea      Vital Signs Last 24 Hrs  T(C): 36.6 (21 Dec 2021 05:46), Max: 36.8 (20 Dec 2021 14:30)  T(F): 97.8 (21 Dec 2021 05:46), Max: 98.3 (21 Dec 2021 00:13)  HR: 70 (21 Dec 2021 05:46) (70 - 96)  BP: 129/81 (21 Dec 2021 05:46) (88/50 - 132/65)  BP(mean): 90 (20 Dec 2021 15:00) (79 - 90)  RR: 18 (21 Dec 2021 05:46) (16 - 21)  SpO2: 94% (21 Dec 2021 05:46) (93% - 99%)    Physical Exam:  General: NAD, resting comfortably in bed  Pulmonary: Nonlabored breathing, no respiratory distress  Cardiovascular: NSR  Abdominal: soft, NT/ND, ventral hernia apparent, previous midline lap scar well healed. Port sites cdi  Extremities: WWP, normal strength  Neuro: A/O x 3, CNs II-XII grossly intact, no focal deficits, normal motor/sensation  Pulses: palpable distal pulses    I&O's Summary    20 Dec 2021 07:01  -  21 Dec 2021 06:47  --------------------------------------------------------  IN: 4515 mL / OUT: 1030 mL / NET: 3485 mL        LABS:                        13.9   14.34 )-----------( 282      ( 20 Dec 2021 18:31 )             44.4

## 2021-12-21 NOTE — DISCHARGE NOTE PROVIDER - NSDCCPGOAL_GEN_ALL_CORE_FT
To get better and follow your care plan as instructed.  1) Please take Tylenol 650 mg every 4 to 6 hours by mouth for moderate pain control. Please do not exceed over 4,000 mg of Tylenol a day.  2) Please start taking Eliquis 2.5 mg by mouth twice a day starting 3 days after surgery, Thursday December 23, 2021 .  3) Please take Omeprazole 40 mg once a day by mouth.

## 2021-12-21 NOTE — DISCHARGE NOTE NURSING/CASE MANAGEMENT/SOCIAL WORK - NSDCPEFALRISK_GEN_ALL_CORE
For information on Fall & Injury Prevention, visit: [email or web address] OR  [email or web address] OR  https://www.cdc.gov/steadi/patient.html

## 2021-12-21 NOTE — DISCHARGE NOTE PROVIDER - HOSPITAL COURSE
36 year old male with past medical history of HTN, RAZIA, morbid obesity ( BMI 51) Crohn's disease w/ entero-colonic fistula (s/p ex lap, ileocolic resection, sigmoidectomy, repair of entero-colonic fistula, and ileostomy in 6/2020 and ileostomy reversal 12/2020), s/p robotic assisted sleeve gastrectomy on 12/20.  Pt tolerated the procedure well. At time of discharge, pt was tolerating a bariatric clear liquid diet, and pt's pain was controlled. Plan is to follow up with Dr. Rapp  in the office.

## 2021-12-23 ENCOUNTER — NON-APPOINTMENT (OUTPATIENT)
Age: 36
End: 2021-12-23

## 2021-12-28 DIAGNOSIS — E66.01 MORBID (SEVERE) OBESITY DUE TO EXCESS CALORIES: ICD-10-CM

## 2021-12-28 DIAGNOSIS — G47.33 OBSTRUCTIVE SLEEP APNEA (ADULT) (PEDIATRIC): ICD-10-CM

## 2021-12-28 DIAGNOSIS — K43.9 VENTRAL HERNIA WITHOUT OBSTRUCTION OR GANGRENE: ICD-10-CM

## 2021-12-28 DIAGNOSIS — K50.10 CROHN'S DISEASE OF LARGE INTESTINE WITHOUT COMPLICATIONS: ICD-10-CM

## 2021-12-28 DIAGNOSIS — I10 ESSENTIAL (PRIMARY) HYPERTENSION: ICD-10-CM

## 2021-12-28 DIAGNOSIS — K44.9 DIAPHRAGMATIC HERNIA WITHOUT OBSTRUCTION OR GANGRENE: ICD-10-CM

## 2021-12-29 LAB — SURGICAL PATHOLOGY STUDY: SIGNIFICANT CHANGE UP

## 2022-01-12 ENCOUNTER — APPOINTMENT (OUTPATIENT)
Dept: BARIATRICS | Facility: CLINIC | Age: 37
End: 2022-01-12
Payer: COMMERCIAL

## 2022-01-12 VITALS
WEIGHT: 290 LBS | BODY MASS INDEX: 45.52 KG/M2 | TEMPERATURE: 97.1 F | SYSTOLIC BLOOD PRESSURE: 135 MMHG | OXYGEN SATURATION: 98 % | HEIGHT: 67 IN | HEART RATE: 77 BPM | DIASTOLIC BLOOD PRESSURE: 78 MMHG

## 2022-01-12 PROBLEM — G47.33 OBSTRUCTIVE SLEEP APNEA (ADULT) (PEDIATRIC): Chronic | Status: ACTIVE | Noted: 2021-12-17

## 2022-01-12 PROBLEM — K42.9 UMBILICAL HERNIA WITHOUT OBSTRUCTION OR GANGRENE: Chronic | Status: ACTIVE | Noted: 2021-12-17

## 2022-01-12 PROBLEM — I10 ESSENTIAL (PRIMARY) HYPERTENSION: Chronic | Status: ACTIVE | Noted: 2021-12-17

## 2022-01-12 PROBLEM — E66.01 MORBID (SEVERE) OBESITY DUE TO EXCESS CALORIES: Chronic | Status: ACTIVE | Noted: 2021-12-17

## 2022-01-12 PROBLEM — R73.03 PREDIABETES: Chronic | Status: ACTIVE | Noted: 2021-12-17

## 2022-01-12 PROCEDURE — 99024 POSTOP FOLLOW-UP VISIT: CPT

## 2022-01-20 PROCEDURE — 86900 BLOOD TYPING SEROLOGIC ABO: CPT

## 2022-01-20 PROCEDURE — 86901 BLOOD TYPING SEROLOGIC RH(D): CPT

## 2022-01-20 PROCEDURE — 36415 COLL VENOUS BLD VENIPUNCTURE: CPT

## 2022-01-20 PROCEDURE — 83735 ASSAY OF MAGNESIUM: CPT

## 2022-01-20 PROCEDURE — 80048 BASIC METABOLIC PNL TOTAL CA: CPT

## 2022-01-20 PROCEDURE — 84100 ASSAY OF PHOSPHORUS: CPT

## 2022-01-20 PROCEDURE — 94660 CPAP INITIATION&MGMT: CPT

## 2022-01-20 PROCEDURE — 85027 COMPLETE CBC AUTOMATED: CPT

## 2022-01-20 PROCEDURE — 88307 TISSUE EXAM BY PATHOLOGIST: CPT

## 2022-01-20 PROCEDURE — C1889: CPT

## 2022-01-20 PROCEDURE — 86850 RBC ANTIBODY SCREEN: CPT

## 2022-01-20 PROCEDURE — S2900: CPT

## 2022-01-25 NOTE — ASSESSMENT
[FreeTextEntry1] : He was instructed to take at least get in at least 6 grams of protein in with his shakes. He has told to follow up with our dietitian for guidance. He was told to start a regular exercise regimen.

## 2022-01-25 NOTE — HISTORY OF PRESENT ILLNESS
[de-identified] : Patient is doing well 3 weeks s/p sleeve gastrectomy. He is eating a proper diet but may be falling short of his daily protein needs. He is taking his vitamins but is not exercising.

## 2022-02-09 ENCOUNTER — APPOINTMENT (OUTPATIENT)
Dept: BARIATRICS | Facility: CLINIC | Age: 37
End: 2022-02-09
Payer: COMMERCIAL

## 2022-02-09 VITALS
WEIGHT: 279 LBS | HEIGHT: 67 IN | TEMPERATURE: 97.2 F | SYSTOLIC BLOOD PRESSURE: 118 MMHG | DIASTOLIC BLOOD PRESSURE: 73 MMHG | OXYGEN SATURATION: 97 % | HEART RATE: 69 BPM | BODY MASS INDEX: 43.79 KG/M2

## 2022-02-09 PROCEDURE — 99024 POSTOP FOLLOW-UP VISIT: CPT

## 2022-04-26 NOTE — HISTORY OF PRESENT ILLNESS
[de-identified] : Patient is doing well and has no complaints s/p sleeve gastrectomy on 12/21/21. He is eating a proper diet and taking his vitamins. He is not exercising.

## 2022-04-26 NOTE — ASSESSMENT
[FreeTextEntry1] : He was told to start a regular strength training regimen. Routine postop blood work was ordered.

## 2022-04-27 ENCOUNTER — APPOINTMENT (OUTPATIENT)
Dept: BARIATRICS | Facility: CLINIC | Age: 37
End: 2022-04-27

## 2022-04-27 VITALS
WEIGHT: 261.31 LBS | DIASTOLIC BLOOD PRESSURE: 78 MMHG | OXYGEN SATURATION: 97 % | TEMPERATURE: 97.2 F | SYSTOLIC BLOOD PRESSURE: 132 MMHG | HEART RATE: 79 BPM | HEIGHT: 67 IN | BODY MASS INDEX: 41.01 KG/M2

## 2022-04-27 DIAGNOSIS — K91.2 POSTSURGICAL MALABSORPTION, NOT ELSEWHERE CLASSIFIED: ICD-10-CM

## 2022-04-27 PROCEDURE — 99212 OFFICE O/P EST SF 10 MIN: CPT

## 2022-06-30 NOTE — ASSESSMENT
[FreeTextEntry1] : She was told to start a regular strength training regimen. She was to to get guidance from our dietitian.

## 2022-06-30 NOTE — HISTORY OF PRESENT ILLNESS
[de-identified] : Patient is doing well and has no complaints s/p VSG 12/20/21. She is eating a proper diet and taking her vitamins. She is not exercising.

## 2022-06-30 NOTE — END OF VISIT
Hospitalist Progress Note      PCP: LUCIA Knowles - CNP    Date of Admission: 1/1/2021        Subjective:   Patient is feeling much better today. .. Nausea and vomiting are improved. No abdominal pain or diarrhea. No hemetemesis,melena or hematochezia. Still having fevers      Medications:  Reviewed    Infusion Medications     Scheduled Medications    potassium chloride  10 mEq Intravenous Q1H    citalopram  40 mg Oral Daily    methocarbamol  500 mg Oral TID    metoprolol succinate  25 mg Oral Daily    rosuvastatin  20 mg Oral Nightly    pantoprazole  40 mg Intravenous BID    sodium chloride flush  10 mL Intravenous 2 times per day     PRN Meds: benzocaine-menthol, HYDROcodone-acetaminophen, ondansetron, promethazine, sodium chloride flush, acetaminophen **OR** acetaminophen      Intake/Output Summary (Last 24 hours) at 1/3/2021 1054  Last data filed at 1/3/2021 1007  Gross per 24 hour   Intake    Output 650 ml   Net -650 ml       Exam:    /68   Pulse 110   Temp 100.1 °F (37.8 °C) (Axillary)   Resp 18   Ht 5' 4\" (1.626 m)   Wt 158 lb 12.8 oz (72 kg)   LMP  (LMP Unknown)   SpO2 95%   BMI 27.26 kg/m²     General appearance: No apparent distress, appears stated age and cooperative. HEENT: Pupils equal, round, and reactive to light. Conjunctivae/corneas clear. Neck: Supple, with full range of motion. No jugular venous distention. Trachea midline. Respiratory:  Normal respiratory effort. Clear to auscultation, bilaterally without Rales/Wheezes/Rhonchi. Cardiovascular: Regular rate and rhythm with normal S1/S2 without murmurs, rubs or gallops. Abdomen: Soft, non-tender, non-distended with normal bowel sounds. Musculoskeletal: No clubbing, cyanosis or edema bilaterally. Full range of motion without deformity. Skin: Skin color, texture, turgor normal.  No rashes or lesions. Neurologic:  Neurovascularly intact without any focal sensory/motor deficits. Cranial nerves: II-XII intact, grossly non-focal.  Psychiatric: Alert and oriented, thought content appropriate, normal insight  Capillary Refill: Brisk,< 3 seconds   Peripheral Pulses: +2 palpable, equal bilaterally       Labs:   Recent Labs     01/01/21  1632 01/01/21  1632 01/02/21  0533 01/02/21  1742 01/03/21  0531   WBC 4.8  --  3.6*  --  5.2   HGB 13.7   < > 11.6* 12.7 12.3   HCT 40.3   < > 34.0* 38.0 37.0     --  212  --  258    < > = values in this interval not displayed. Recent Labs     01/01/21  1632 01/02/21  0533 01/03/21  0531    141 141   K 2.8* 3.3* 3.2*   CL 97* 105 101   CO2 27 27 20*   BUN 14 14 10   CREATININE 0.7 0.7 0.7   CALCIUM 9.2 8.2* 8.7     Recent Labs     01/01/21  1632   AST 18   ALT 9*   BILITOT 0.6   ALKPHOS 65     No results for input(s): INR in the last 72 hours. Recent Labs     01/01/21  1632 01/01/21  2042   TROPONINI <0.01 <0.01       Assessment/Plan:      -COVID-19 infection. No overt pneumonia on chest x-ray . patient does not require oxygen  -Nausea and vomiting /fevers dt COVID-19 -improved. concerns for GI bleed on CTwhich showed hiatal hernia with gastric contents that could be blood although this most likely due to contrast-no melena, hematochezia or hematemesis. Colleen Profit  H&H is stable  -Sepsis due to COVID-19--poa--fever one 101.9, sinus tach 121  -Severe hypokalemia-k 2.8-- improving  -CAD s/p LAD stent-on aspirin, Brilinta, metoprolol and statin  -SVT s/p ablation--having sinus tach  -Essential hypertension-on metoprolol  -Hyperlipidemia-statin  -GERD with hiatal hernia-moderate-sized on CT-on PPI  -Leukopenia COVID-19  -chronic migraine headaches-stable- on Emgality    Plan  Continue supportive treatment for COVID-19  Start clear liquid diet and advance as tolerated  continue IV fluids  Replete potassium  Continue PPI empirically  Continue supportive care for COVID-19 [Time Spent: ___ minutes] : I have spent [unfilled] minutes of time on the encounter. Resume antiplatelet therapy    DVT Prophylaxis: Lovenox  Diet: Diet NPO Effective Now  Code Status: Full Code        Familia Matson MD [>50% of the face to face encounter time was spent on counseling and/or coordination of care for ___] : Greater than 50% of the face to face encounter time was spent on counseling and/or coordination of care for [unfilled]

## 2022-07-05 LAB
PROMETHEUS ANSER UST: NORMAL
SERUM USTEKINUMAB CONCENTRATION: 2.1 UG/ML
USTEKINUMAB ANTIBODIES CONCENTRATION: < 1.6 U/ML

## 2022-07-12 ENCOUNTER — NON-APPOINTMENT (OUTPATIENT)
Age: 37
End: 2022-07-12

## 2022-07-12 DIAGNOSIS — Z98.84 BARIATRIC SURGERY STATUS: ICD-10-CM

## 2022-07-18 ENCOUNTER — OUTPATIENT (OUTPATIENT)
Dept: OUTPATIENT SERVICES | Facility: HOSPITAL | Age: 37
LOS: 1 days | Discharge: HOME | End: 2022-07-18

## 2022-07-18 DIAGNOSIS — Z98.890 OTHER SPECIFIED POSTPROCEDURAL STATES: Chronic | ICD-10-CM

## 2022-07-18 DIAGNOSIS — Z20.828 CONTACT WITH AND (SUSPECTED) EXPOSURE TO OTHER VIRAL COMMUNICABLE DISEASES: ICD-10-CM

## 2022-07-19 LAB — SARS-COV-2 RNA SPEC QL NAA+PROBE: SIGNIFICANT CHANGE UP

## 2022-10-31 NOTE — PRE-OP CHECKLIST - AS BP NONINV METHOD
Message sent via [employer]   electronic I have personally evaluated and examined the patient. The Attending was available to me as a supervising provider if needed.

## 2022-11-30 NOTE — PHYSICAL EXAM
[Exam Deferred] : exam was deferred [Normal Breath Sounds] : Normal breath sounds [Respiratory Effort] : normal respiratory effort [No Rash or Lesion] : No rash or lesion [Alert] : alert [Calm] : calm [Abdomen Masses] : No abdominal masses [de-identified] : obese, nontender, penrose drain in old ostomy site with granulation tissue [de-identified] : alert, calm, disheveled [de-identified] : moves all extremities spontaneously Satisfactory

## 2022-12-01 ENCOUNTER — APPOINTMENT (OUTPATIENT)
Dept: BARIATRICS | Facility: CLINIC | Age: 37
End: 2022-12-01

## 2022-12-01 VITALS
OXYGEN SATURATION: 98 % | SYSTOLIC BLOOD PRESSURE: 126 MMHG | WEIGHT: 259 LBS | HEART RATE: 78 BPM | HEIGHT: 67 IN | TEMPERATURE: 96.3 F | DIASTOLIC BLOOD PRESSURE: 78 MMHG | BODY MASS INDEX: 40.65 KG/M2

## 2022-12-01 PROCEDURE — 99204 OFFICE O/P NEW MOD 45 MIN: CPT

## 2022-12-02 NOTE — ASSESSMENT
[FreeTextEntry1] : Patient is a 37 year year old M  with a PMHx of obesity (BMI 40),Crohn's disease and PSHx of bladder fistula repair, VSG on 12/20/21, s/p small bowel resection, ileostomy and sigmoidectomy who presents today for initial evaluation of ventral hernia and possible conversion bariatric surgery. 's patient. Lost 60 lbs post VSG. Patient's most recent labs were reviewed which was significant for a low Vitamin D. He is not compliant on the vitamins and supplements from the bariatric surgery. On PE today, a very large ventral hernia was observed.  Discussed possible surgical and non-surgical weight loss options with the patient that includes GLP analog. He reports that he has tried Ozempic treatment before the VSG but was unsuccessful to lose much weight. Reports that he still feels the restriction from the sleeve gastrectomy.  With patient's h/o Chron's disease, have suggested medical weight loss with Ozempic or Manjaro. With the size of the ventral hernia, have suggested the patient to start medical weight loss at this time. Will also get a CT abdomen to evaluate any scar tissue from the patient's surgical history. Patient understands that he will need to lose some weight before repairing the ventral hernia. \par  \par \par

## 2022-12-02 NOTE — END OF VISIT
[Time Spent: ___ minutes] : I have spent [unfilled] minutes of time on the encounter. [FreeTextEntry3] : All medical record entries made by the Scribe were at my, PAMELA Tijerina , direction and personally dictated by me on 12/01/2022 . I have reviewed the chart and agree that the record accurately reflects my personal performance of the history, physical exam, assessment and plan. I have also personally directed, reviewed, and agreed with the chart.\par

## 2022-12-02 NOTE — ADDENDUM
[FreeTextEntry1] : Documented by Maurisio Freed acting as a scribe for PAMELA Tijerina on 12/01/2022\par

## 2022-12-02 NOTE — HISTORY OF PRESENT ILLNESS
[de-identified] : Patient is a 37 year year old M  with a PMHx of obesity (BMI 40),Crohn's disease and PSHx of bladder fistula repair, VSG on 12/20/21, s/p small bowel resection, ileostomy and sigmoidectomy who presents today for initial evaluation of ventral hernia and possible conversion bariatric surgery. 's patient. Lost 60 lbs post VSG. Patient's most recent labs were reviewed which was significant for a low Vitamin D. He is not compliant on the vitamins and supplements from the bariatric surgery. On PE today, a very large ventral hernia was observed.  Discussed possible surgical and non-surgical weight loss options with the patient that includes GLP analog. He reports that he has tried Ozempic treatment before the VSG but was unsuccessful to lose much weight. Reports that he still feels the restriction from the sleeve gastrectomy.  With patient's h/o Chron's disease, have suggested medical weight loss with Ozempic or Manjaro. With the size of the ventral hernia, have suggested the patient to start medical weight loss at this time. Will also get a CT abdomen to evaluate any scar tissue from the patient's surgical history. Patient understands that he will need to lose some weight before repairing the ventral hernia.

## 2022-12-07 ENCOUNTER — TRANSCRIPTION ENCOUNTER (OUTPATIENT)
Age: 37
End: 2022-12-07

## 2022-12-15 ENCOUNTER — TRANSCRIPTION ENCOUNTER (OUTPATIENT)
Age: 37
End: 2022-12-15

## 2023-01-17 NOTE — DISCHARGE NOTE PROVIDER - NSDCFUADDINST_GEN_ALL_CORE_FT
Follow up with  in 1 week. Call the office at  to schedule your appointment.  You may shower; soap and water over incision sites. Do not scrub. Pat dry when done. No tub bathing or swimming until cleared. Keep incision sites out of the sun as scars will darken. No heavy lifting (>10lbs) or strenuous exercise. Diet: Bariatric Full Fluids. 60 grams protein daily.  64 fluid ounces water daily. Drink small sips throughout the day. Continue diet as outlined by paperwork received as a pre-operative patient. You should be urinating at least 3-4x per day. Call the office if you experience increasing abdominal pain, nausea, vomiting, or temperature >100.4F.  NO ASPIRIN OR NSAIDs until approved by Dr. Rapp. Avoid alcoholic beverages until cleared by Dr. Rapp.  1) Please take Tylenol 650 mg every 4 to 6 hours by mouth for moderate pain control. Please do not exceed over 4,000 mg of Tylenol a day.  2) Please start taking Eliquis 2.5 mg by mouth twice a day starting 3 days after surgery, Thursday December 23, 2021 .  3) Please take Omeprazole 40 mg once a day by mouth. Ilumya Counseling: I discussed with the patient the risks of tildrakizumab including but not limited to immunosuppression, malignancy, posterior leukoencephalopathy syndrome, and serious infections.  The patient understands that monitoring is required including a PPD at baseline and must alert us or the primary physician if symptoms of infection or other concerning signs are noted.

## 2023-05-17 ENCOUNTER — APPOINTMENT (OUTPATIENT)
Dept: GASTROENTEROLOGY | Facility: CLINIC | Age: 38
End: 2023-05-17
Payer: COMMERCIAL

## 2023-05-17 VITALS
SYSTOLIC BLOOD PRESSURE: 130 MMHG | HEIGHT: 67 IN | BODY MASS INDEX: 37.67 KG/M2 | OXYGEN SATURATION: 97 % | WEIGHT: 240 LBS | HEART RATE: 91 BPM | RESPIRATION RATE: 16 BRPM | DIASTOLIC BLOOD PRESSURE: 80 MMHG | TEMPERATURE: 96.8 F

## 2023-05-17 PROCEDURE — 99213 OFFICE O/P EST LOW 20 MIN: CPT

## 2023-05-20 NOTE — REVIEW OF SYSTEMS
[Negative] : Heme/Lymph [Recent Weight Loss (___ Lbs)] : recent [unfilled] ~Ulb weight loss [As Noted in HPI] : as noted in HPI [Constipation] : constipation [Abdominal Pain] : no abdominal pain [Vomiting] : no vomiting [Diarrhea] : no diarrhea [Heartburn] : no heartburn [Melena (black stool)] : no melena [Bleeding] : no bleeding [Fecal Incontinence (soiling)] : no fecal incontinence [Bloating (gassiness)] : no bloating

## 2023-05-20 NOTE — PHYSICAL EXAM
[Alert] : alert [Normal Voice/Communication] : normal voice/communication [Healthy Appearing] : healthy appearing [No Acute Distress] : no acute distress [Obese (BMI >= 30)] : obese (BMI >= 30) [Sclera] : the sclera and conjunctiva were normal [Hearing Threshold Finger Rub Not [place]] : hearing was normal [Normal Lips/Gums] : the lips and gums were normal [Oropharynx] : the oropharynx was normal [Normal Appearance] : the appearance of the neck was normal [No Neck Mass] : no neck mass was observed [No Respiratory Distress] : no respiratory distress [No Acc Muscle Use] : no accessory muscle use [Respiration, Rhythm And Depth] : normal respiratory rhythm and effort [Abdomen Tenderness] : non-tender [No Masses] : no abdominal mass palpated [Abdomen Soft] : soft [No CVA Tenderness] : no CVA  tenderness [No Spinal Tenderness] : no spinal tenderness [Abnormal Walk] : normal gait [No Clubbing, Cyanosis] : no clubbing or cyanosis of the fingernails [No Joint Swelling] : no joint swelling seen [Normal Color / Pigmentation] : normal skin color and pigmentation [] : no rash [Cranial Nerves Intact] : cranial nerves 2-12 were intact [Sensation] : the sensory exam was normal to light touch and pinprick [Motor Exam] : the motor exam was normal [Oriented To Time, Place, And Person] : oriented to person, place, and time [de-identified] : large ventral hernia, normal overlying skin color [de-identified] : dry skin

## 2023-05-20 NOTE — HISTORY OF PRESENT ILLNESS
[FreeTextEntry1] : 38M PMHx hx morbid obesity s/p sleeve gastrectomy (12/2021), psoriasis and inflammatory ileocolonic CD with likely scarring, stricturing, and multiple fistulae (entero-enteric, entero-colon, and entero-vesicular), previously on ADA however, developed Ab due to poor compliance/intermittent dosing, Currently on monotherapy UST (started 2/4/19), however had drug holiday due to surgeries, therefore he was re-loaded with infusion March 17, 2021. Overall he feels well. S/P exp lap and sigmoid bowel resection with ileostomy creation and takedown of entero-colonic fistula, repair of bladder on 6/9/20, ileostomy closure 12/22/20, with i2 disease on colonoscopy 2021, presenting for follow up.\par \par 5/17/23:\par -Reports seeing Dr Grider as an outpatient to discuss weight loss options, noted during exam to have a large ventral hernia, recommended that further weight loss needed to be achieved prior to repair of the ventral hernia. \par -Notes starting Ozempic approx 2 months ago, prescribed by PCP, has lost approx 15 lbs since starting medication. Overall, has had at least 100 lb wt loss since his sleeve gastrectomy. Denies any associated symptoms with the use of Ozempic.\par -Notes having more constipation after starting Ozempic, no associated abdominal pain, bloating, reports taking a stool softener PRN\par -ROS negative for fevers, chills, NS, nausea/vomiting, bloody BMs. \par \par 10/5/21\par EGD: EGD Impressions: \par  Esophageal hiatal hernia. \par  Normal mucosa in the whole esophagus. \par  Normal mucosa in the stomach. (Biopsy). \par  Normal mucosa in the whole examined duodenum. (Biopsy). \par CSCOPE: An end to end anastomosis was seen in the sigmoid colon. A small non-healing \par ulcer was seen at the anastomosis. An end to side anastomosis was seen in the \par Cecum, characterized by healthy appearance. The amado-terminal ileum was \par characterized by mildly friable mucosa with >5 aphthous ulcer, consistent with \par Rutgreets i2 (our first endoscopic chance to evaluate the TI). Biopsy was done \par from the amado-terminal ileum. Rest of the colon mucosa did not show any disease \par activity.\par PATH:\par 1. Duodenum, 2nd portion, biopsy:\par - Duodenal mucosa with preserved villous architecture and no\par significant histopathologic findings\par \par 2. Stomach, biopsy:\par - Gastric mucosa, body type, with no significant histopathologic\par findings\par \par 3. Neoterimal ileum, biopsy:\par - Active chronic enteritis with cryptitis and crypt architecture\par distortion\par - No dysplasia or malignancy identified\par \par 4. Right colon, biopsy:\par - Mild active chronic colitis with cryptitis and lymphoid aggregate\par - No dysplasia or malignancy identified\par \par 5. Left colon, biopsy:\par - Colonic mucosa with no significant histopathologic findings\par  \par \par Patient reports feeling back to normal with normal BMs, alternate consistency. Some gas, but overall stable. No blood or abdominal pain. No nausea or vomiting. No fevers or chills. \par \par Has psoriasis that worsened off of Stelara, has not seen derm as he reports "creams don't work."\par Of note, has abdominal hernia, and went to see a surgeon at [place] who advised he has Sleeve gastrectomy before having Hernia repair given his morbid obesity (BMI 47). Pt interested in our opinion on this matter. He has since seen Dr. Larios and is pending nutritional consult. \par \par PATH 12/22/20: \par Ileostomy, excision:\par - Ileostomy with chronic inflammation and fibrosis of\par mucocutaneous\par junction\par \par CTE 9/10/19: stable colo-enteric, interloop and enterovesicular fistula. Mild fibrous stenotic disease of distal ileum. No new disease or acute findings. \par \par Saw Dr. Booker since last visit reporting good wound healing. \par He was compliant with his Stelara dosing and denies any ADRs to medication.\par \par Of note, CT scan revealed ground-glass nodule in left lower lobe and saw Dr. Kennedy who has advised continued monitoring. \par \par No EIMs. \par \par IBD history: \par \par Denies EIM. No smoking. \par + anal fissures\par + psoriasis \par \par CTE with matted small and large bowel loops and multiple fistulae, scarring\par Colonoscopy with evidence of scarring, pseudopolyps, fistula opening seen. Strictured AC, cecal inflammatory polyps. Unable to advance to TI due to scarring \par EGD with gastric erythema, path (-) for HP\par

## 2023-05-20 NOTE — ASSESSMENT
[FreeTextEntry1] : 38M PMHx hx morbid obesity s/p sleeve gastrectomy (12/2021), psoriasis and inflammatory ileocolonic CD with likely scarring, stricturing, and multiple fistulae (entero-enteric, entero-colon, and entero-vesicular), previously on ADA however, developed Ab due to poor compliance/intermittent dosing, Currently on monotherapy UST (started 2/4/19), however had drug holiday due to surgeries, therefore he was re-loaded with infusion March 17, 2021. Overall he feels well. S/P exp lap and sigmoid bowel resection with ileostomy creation and takedown of entero-colonic fistula, repair of bladder on 6/9/20, ileostomy closure 12/22/20, with i2 disease on colonoscopy 2021, presenting for follow up.\par \par #Fistulizing, stricturing ileocolonic CD \par On Monotherapy UST q8 weeks, asymptomatic however not in endoscopic remission, last colonoscopy with evidence of i2 disease. \par -Ordered for UST drug/ab levels (due for next injection 3 weeks from now)\par -Recent BW 5/10/23 with elevated CRP (19.4; reference range <8), previously tracked chemical biomarker on BW, where noted to have stable elevated CRP. Will re-check CRP level\par -Last colonoscopy 2021, due for surveillance colonoscopy, patient expressed preference for colonoscopy to be performed sometime in the Fall, R/B/A/L discussed, to [facility]. All questions answered.\par \par RTC 4 weeks prior to planned colonoscopy for labs/follow up\par \par Aurora Ro DO\par Gastroenterology Fellow\par

## 2023-05-23 ENCOUNTER — RX RENEWAL (OUTPATIENT)
Age: 38
End: 2023-05-23

## 2023-05-23 NOTE — DISCHARGE NOTE PROVIDER - NSDCQMAMI_CARD_ALL_CORE
[Well Developed] : well developed [Well Nourished] : well nourished [No Acute Distress] : no acute distress [Normal Venous Pressure] : normal venous pressure [No Carotid Bruit] : no carotid bruit [Normal S1, S2] : normal S1, S2 [No Murmur] : no murmur [No Rub] : no rub [No Gallop] : no gallop [Clear Lung Fields] : clear lung fields No [Good Air Entry] : good air entry [No Respiratory Distress] : no respiratory distress  [Soft] : abdomen soft [Non Tender] : non-tender [No Masses/organomegaly] : no masses/organomegaly [Normal Bowel Sounds] : normal bowel sounds [Normal Gait] : normal gait [No Edema] : no edema [No Cyanosis] : no cyanosis [No Clubbing] : no clubbing [No Varicosities] : no varicosities [No Rash] : no rash [No Skin Lesions] : no skin lesions [Moves all extremities] : moves all extremities [No Focal Deficits] : no focal deficits [Normal Speech] : normal speech [Alert and Oriented] : alert and oriented [Normal memory] : normal memory [de-identified] : Left conjunctival hemorrhage.

## 2024-01-29 ENCOUNTER — TRANSCRIPTION ENCOUNTER (OUTPATIENT)
Age: 39
End: 2024-01-29

## 2024-02-27 ENCOUNTER — APPOINTMENT (OUTPATIENT)
Dept: GASTROENTEROLOGY | Facility: CLINIC | Age: 39
End: 2024-02-27
Payer: COMMERCIAL

## 2024-02-27 DIAGNOSIS — K50.90 CROHN'S DISEASE, UNSPECIFIED, W/OUT COMPLICATIONS: ICD-10-CM

## 2024-02-27 DIAGNOSIS — E66.01 MORBID (SEVERE) OBESITY DUE TO EXCESS CALORIES: ICD-10-CM

## 2024-02-27 PROCEDURE — G2211 COMPLEX E/M VISIT ADD ON: CPT | Mod: NC,1L

## 2024-02-27 PROCEDURE — 99443: CPT

## 2024-02-28 NOTE — REVIEW OF SYSTEMS
[Abdominal Pain] : no abdominal pain [Diarrhea] : no diarrhea [Vomiting] : no vomiting [Heartburn] : no heartburn [Melena (black stool)] : no melena [Fecal Incontinence (soiling)] : no fecal incontinence [Swollen Glands] : no swollen glands

## 2024-02-28 NOTE — HISTORY OF PRESENT ILLNESS
[Home] : at home, [unfilled] , at the time of the visit. [Medical Office: ([facility])___] : at the medical office located in  [Verbal consent obtained from patient] : the patient, [unfilled] [FreeTextEntry1] : 38M PMHx hx morbid obesity s/p sleeve gastrectomy (12/2021), psoriasis and inflammatory ileocolonic CD with likely scarring, stricturing, and multiple fistulae (entero-enteric, entero-colon, and entero-vesicular), previously on ADA however, developed Ab due to poor compliance/intermittent dosing, Currently on monotherapy UST (started 2/4/19), however had drug holiday due to surgeries, therefore he was re-loaded with infusion March 17, 2021. Overall he feels well. S/P exp lap and sigmoid bowel resection with ileostomy creation and takedown of entero-colonic fistula, repair of bladder on 6/9/20, ileostomy closure 12/22/20, with i2 disease on colonoscopy 2021, presenting for follow up, has CSCOPE scheduled for next week.  Pt reports feeling overall well, does have c/o epigastric gas pains. GAS-X with no real relief. BMs do not affect pain. No nausea/vomiting. Mild bloating. Taking Stelara q 8 weeks. Was on Ozempic but stopped 2-3 mo ago.   5/17/23: -Reports seeing Dr Grider as an outpatient to discuss weight loss options, noted during exam to have a large ventral hernia, recommended that further weight loss needed to be achieved prior to repair of the ventral hernia.  -Notes starting Ozempic approx 2 months ago, prescribed by PCP, has lost approx 15 lbs since starting medication. Overall, has had at least 100 lb wt loss since his sleeve gastrectomy. Denies any associated symptoms with the use of Ozempic. -Notes having more constipation after starting Ozempic, no associated abdominal pain, bloating, reports taking a stool softener PRN -ROS negative for fevers, chills, NS, nausea/vomiting, bloody BMs.   10/5/21 EGD: EGD Impressions:   Esophageal hiatal hernia.   Normal mucosa in the whole esophagus.   Normal mucosa in the stomach. (Biopsy).   Normal mucosa in the whole examined duodenum. (Biopsy).  CSCOPE: An end to end anastomosis was seen in the sigmoid colon. A small non-healing  ulcer was seen at the anastomosis. An end to side anastomosis was seen in the  Cecum, characterized by healthy appearance. The amado-terminal ileum was  characterized by mildly friable mucosa with >5 aphthous ulcer, consistent with  Rutgreets i2 (our first endoscopic chance to evaluate the TI). Biopsy was done  from the amado-terminal ileum. Rest of the colon mucosa did not show any disease  activity. PATH: 1. Duodenum, 2nd portion, biopsy: - Duodenal mucosa with preserved villous architecture and no significant histopathologic findings  2. Stomach, biopsy: - Gastric mucosa, body type, with no significant histopathologic findings  3. Neoterimal ileum, biopsy: - Active chronic enteritis with cryptitis and crypt architecture distortion - No dysplasia or malignancy identified  4. Right colon, biopsy: - Mild active chronic colitis with cryptitis and lymphoid aggregate - No dysplasia or malignancy identified  5. Left colon, biopsy: - Colonic mucosa with no significant histopathologic findings    Patient reports feeling back to normal with normal BMs, alternate consistency. Some gas, but overall stable. No blood or abdominal pain. No nausea or vomiting. No fevers or chills.   Has psoriasis that worsened off of Stelara, has not seen derm as he reports "creams don't work." Of note, has abdominal hernia, and went to see a surgeon at [place] who advised he has Sleeve gastrectomy before having Hernia repair given his morbid obesity (BMI 47). Pt interested in our opinion on this matter. He has since seen Dr. Larios and is pending nutritional consult.   PATH 12/22/20:  Ileostomy, excision: - Ileostomy with chronic inflammation and fibrosis of mucocutaneous junction  CTE 9/10/19: stable colo-enteric, interloop and enterovesicular fistula. Mild fibrous stenotic disease of distal ileum. No new disease or acute findings.   Saw Dr. Booker since last visit reporting good wound healing.  He was compliant with his Stelara dosing and denies any ADRs to medication.  Of note, CT scan revealed ground-glass nodule in left lower lobe and saw Dr. Kennedy who has advised continued monitoring.   No EIMs.   IBD history:   Denies EIM. No smoking.  + anal fissures + psoriasis   CTE with matted small and large bowel loops and multiple fistulae, scarring Colonoscopy with evidence of scarring, pseudopolyps, fistula opening seen. Strictured AC, cecal inflammatory polyps. Unable to advance to TI due to scarring  EGD with gastric erythema, path (-) for HP

## 2024-02-28 NOTE — ASSESSMENT
[FreeTextEntry1] : 38M PMHx hx morbid obesity s/p sleeve gastrectomy (12/2021), psoriasis and inflammatory ileocolonic CD with likely scarring, stricturing, and multiple fistulae (entero-enteric, entero-colon, and entero-vesicular), previously on ADA however, developed Ab due to poor compliance/intermittent dosing, Currently on monotherapy UST (started 2/4/19), however had drug holiday due to surgeries, therefore he was re-loaded with infusion March 17, 2021. Overall he feels well. S/P exp lap and sigmoid bowel resection with ileostomy creation and takedown of entero-colonic fistula, repair of bladder on 6/9/20, ileostomy closure 12/22/20, with i2 disease on colonoscopy 2021, presenting for follow up.  #Fistulizing, stricturing ileocolonic CD  On Monotherapy UST q8 weeks, asymptomatic however not in endoscopic remission, last colonoscopy with evidence of i2 disease. Plan for CSCOPE after being re-loaded on 2021 to assess for mucosal healing - consider dilation if needed as patient has hx of stricturing disease  - Recent BW 5/10/23 with elevated CRP (19.4; reference range <8), previously tracked chemical biomarker on BW, where noted to have stable elevated CRP. June CRP normalized.  - Last colonoscopy 2021, due for surveillance colonoscopy, however given c/o gas pain plan for possible dilation  #Gas pain/non specific epigastric complaints  - plan for possible EGD/DUEÑAS with Dr. Guillen pending CSCOPE results   F/U post-procedure

## 2024-03-05 ENCOUNTER — OUTPATIENT (OUTPATIENT)
Dept: OUTPATIENT SERVICES | Facility: HOSPITAL | Age: 39
LOS: 1 days | Discharge: HOME | End: 2024-03-05
Payer: COMMERCIAL

## 2024-03-05 ENCOUNTER — RESULT REVIEW (OUTPATIENT)
Age: 39
End: 2024-03-05

## 2024-03-05 ENCOUNTER — APPOINTMENT (OUTPATIENT)
Dept: GASTROENTEROLOGY | Facility: HOSPITAL | Age: 39
End: 2024-03-05

## 2024-03-05 VITALS
DIASTOLIC BLOOD PRESSURE: 81 MMHG | HEART RATE: 72 BPM | WEIGHT: 244.93 LBS | SYSTOLIC BLOOD PRESSURE: 138 MMHG | OXYGEN SATURATION: 97 % | HEIGHT: 65 IN | RESPIRATION RATE: 16 BRPM | TEMPERATURE: 97 F

## 2024-03-05 DIAGNOSIS — Z98.890 OTHER SPECIFIED POSTPROCEDURAL STATES: Chronic | ICD-10-CM

## 2024-03-05 PROCEDURE — 45380 COLONOSCOPY AND BIOPSY: CPT

## 2024-03-05 PROCEDURE — 88305 TISSUE EXAM BY PATHOLOGIST: CPT | Mod: 26

## 2024-03-05 PROCEDURE — 88305 TISSUE EXAM BY PATHOLOGIST: CPT

## 2024-03-05 NOTE — PRE-ANESTHESIA EVALUATION ADULT - NSANTHPMHFT_GEN_ALL_CORE
38M c HTN, Crohn's Dz, GERD, RAZIA, MO s/p gastric sleeve and multiple bowel surgeries for IBD, presents for disease surveillance

## 2024-03-05 NOTE — PRE-ANESTHESIA EVALUATION ADULT - NSANTHINPATMEDSFT_GEN_ALL_CORE
Stelara 90 MG/ML Subcutaneous Solution Prefilled Syringe; MDINJECT 1 SYRINGE  SUBCUTANEOUSLY EVERY 8 WEEKS

## 2024-03-06 LAB — SURGICAL PATHOLOGY STUDY: SIGNIFICANT CHANGE UP

## 2024-05-16 RX ORDER — USTEKINUMAB 90 MG/ML
90 INJECTION, SOLUTION SUBCUTANEOUS
Qty: 1 | Refills: 2 | Status: ACTIVE | COMMUNITY
Start: 2019-03-04

## 2024-06-04 ENCOUNTER — APPOINTMENT (OUTPATIENT)
Dept: SURGICAL ONCOLOGY | Facility: CLINIC | Age: 39
End: 2024-06-04

## 2025-04-07 ENCOUNTER — RX RENEWAL (OUTPATIENT)
Age: 40
End: 2025-04-07

## 2025-05-14 ENCOUNTER — NON-APPOINTMENT (OUTPATIENT)
Age: 40
End: 2025-05-14

## 2025-05-14 ENCOUNTER — APPOINTMENT (OUTPATIENT)
Dept: GASTROENTEROLOGY | Facility: CLINIC | Age: 40
End: 2025-05-14
Payer: MEDICAID

## 2025-05-14 VITALS
OXYGEN SATURATION: 96 % | HEART RATE: 109 BPM | HEIGHT: 67 IN | SYSTOLIC BLOOD PRESSURE: 130 MMHG | WEIGHT: 240 LBS | RESPIRATION RATE: 16 BRPM | TEMPERATURE: 95.8 F | BODY MASS INDEX: 37.67 KG/M2 | DIASTOLIC BLOOD PRESSURE: 70 MMHG

## 2025-05-14 DIAGNOSIS — Z98.84 BARIATRIC SURGERY STATUS: ICD-10-CM

## 2025-05-14 DIAGNOSIS — K50.90 CROHN'S DISEASE, UNSPECIFIED, W/OUT COMPLICATIONS: ICD-10-CM

## 2025-05-14 PROCEDURE — 99214 OFFICE O/P EST MOD 30 MIN: CPT

## 2025-05-14 RX ORDER — TIRZEPATIDE 7.5 MG/.5ML
INJECTION, SOLUTION SUBCUTANEOUS
Refills: 0 | Status: ACTIVE | COMMUNITY

## 2025-05-16 LAB
25(OH)D3 SERPL-MCNC: 17 NG/ML
ALBUMIN SERPL ELPH-MCNC: 4.6 G/DL
ALP BLD-CCNC: 87 U/L
ALT SERPL-CCNC: 16 U/L
ANION GAP SERPL CALC-SCNC: 14 MMOL/L
AST SERPL-CCNC: 18 U/L
BASOPHILS # BLD AUTO: 0.03 K/UL
BASOPHILS NFR BLD AUTO: 0.4 %
BILIRUB SERPL-MCNC: 0.5 MG/DL
BUN SERPL-MCNC: 17 MG/DL
CALCIUM SERPL-MCNC: 9.6 MG/DL
CHLORIDE SERPL-SCNC: 103 MMOL/L
CO2 SERPL-SCNC: 25 MMOL/L
CREAT SERPL-MCNC: 0.83 MG/DL
CRP SERPL-MCNC: 7 MG/L
EGFRCR SERPLBLD CKD-EPI 2021: 114 ML/MIN/1.73M2
EOSINOPHIL # BLD AUTO: 0.05 K/UL
EOSINOPHIL NFR BLD AUTO: 0.7 %
FOLATE SERPL-MCNC: 12.5 NG/ML
GLUCOSE SERPL-MCNC: 91 MG/DL
HCT VFR BLD CALC: 49.5 %
HGB BLD-MCNC: 15.4 G/DL
IMM GRANULOCYTES NFR BLD AUTO: 0.3 %
IRON SATN MFR SERPL: 17 %
IRON SERPL-MCNC: 55 UG/DL
LYMPHOCYTES # BLD AUTO: 1.74 K/UL
LYMPHOCYTES NFR BLD AUTO: 24.5 %
MAN DIFF?: NORMAL
MCHC RBC-ENTMCNC: 25.9 PG
MCHC RBC-ENTMCNC: 31.1 G/DL
MCV RBC AUTO: 83.3 FL
MONOCYTES # BLD AUTO: 0.5 K/UL
MONOCYTES NFR BLD AUTO: 7 %
NEUTROPHILS # BLD AUTO: 4.76 K/UL
NEUTROPHILS NFR BLD AUTO: 67.1 %
PLATELET # BLD AUTO: 221 K/UL
POTASSIUM SERPL-SCNC: 4.7 MMOL/L
PROT SERPL-MCNC: 7 G/DL
RBC # BLD: 5.94 M/UL
RBC # FLD: 13.8 %
SODIUM SERPL-SCNC: 143 MMOL/L
TIBC SERPL-MCNC: 334 UG/DL
UIBC SERPL-MCNC: 278 UG/DL
VIT B12 SERPL-MCNC: 852 PG/ML
WBC # FLD AUTO: 7.1 K/UL